# Patient Record
Sex: FEMALE | Race: WHITE | Employment: PART TIME | URBAN - METROPOLITAN AREA
[De-identification: names, ages, dates, MRNs, and addresses within clinical notes are randomized per-mention and may not be internally consistent; named-entity substitution may affect disease eponyms.]

---

## 2017-01-06 PROBLEM — L70.0 ACNE VULGARIS: Status: ACTIVE | Noted: 2017-01-06

## 2017-11-14 ENCOUNTER — OFFICE VISIT (OUTPATIENT)
Dept: NEUROLOGY | Age: 32
End: 2017-11-14
Payer: COMMERCIAL

## 2017-11-14 VITALS
HEIGHT: 58 IN | SYSTOLIC BLOOD PRESSURE: 163 MMHG | WEIGHT: 128 LBS | DIASTOLIC BLOOD PRESSURE: 97 MMHG | HEART RATE: 85 BPM | BODY MASS INDEX: 26.87 KG/M2

## 2017-11-14 DIAGNOSIS — G89.29 CHRONIC INTRACTABLE HEADACHE, UNSPECIFIED HEADACHE TYPE: Primary | ICD-10-CM

## 2017-11-14 DIAGNOSIS — R51.9 CHRONIC INTRACTABLE HEADACHE, UNSPECIFIED HEADACHE TYPE: Primary | ICD-10-CM

## 2017-11-14 PROCEDURE — G8427 DOCREV CUR MEDS BY ELIG CLIN: HCPCS | Performed by: PSYCHIATRY & NEUROLOGY

## 2017-11-14 PROCEDURE — 1036F TOBACCO NON-USER: CPT | Performed by: PSYCHIATRY & NEUROLOGY

## 2017-11-14 PROCEDURE — 99205 OFFICE O/P NEW HI 60 MIN: CPT | Performed by: PSYCHIATRY & NEUROLOGY

## 2017-11-14 PROCEDURE — G8598 ASA/ANTIPLAT THER USED: HCPCS | Performed by: PSYCHIATRY & NEUROLOGY

## 2017-11-14 PROCEDURE — G8484 FLU IMMUNIZE NO ADMIN: HCPCS | Performed by: PSYCHIATRY & NEUROLOGY

## 2017-11-14 PROCEDURE — G8417 CALC BMI ABV UP PARAM F/U: HCPCS | Performed by: PSYCHIATRY & NEUROLOGY

## 2017-11-14 RX ORDER — TOPIRAMATE 100 MG/1
100 TABLET, FILM COATED ORAL NIGHTLY
Qty: 60 TABLET | Refills: 5 | Status: SHIPPED | OUTPATIENT
Start: 2017-11-14 | End: 2017-11-22 | Stop reason: SDUPTHER

## 2017-11-14 RX ORDER — TOPIRAMATE 50 MG/1
50 TABLET, FILM COATED ORAL DAILY
Qty: 30 TABLET | Refills: 5 | Status: SHIPPED | OUTPATIENT
Start: 2017-11-14 | End: 2017-11-22 | Stop reason: SDUPTHER

## 2017-11-14 NOTE — PROGRESS NOTES
tablet Take 1 tablet by mouth every 8 hours as needed for Nausea or Vomiting 2/23/17  Yes Lincoln Stover NP   Docusate Sodium 100 MG TABS Take by mouth 2 times daily   Yes Historical Provider, MD   acetaminophen (ACETAMINOPHEN 8 HOUR) 650 MG extended release tablet Take by mouth 1/7/09  Yes Historical Provider, MD   fluticasone (FLONASE) 50 MCG/ACT nasal spray 1 spray by Nasal route 2 times daily 1/6/17  Yes Nelida Bateman DO   albuterol sulfate  (90 BASE) MCG/ACT inhaler Inhale 2 puffs into the lungs every 4 hours as needed for Wheezing Ventolin HFA 2/29/16  Yes Payal Trevino MD   topiramate (TOPAMAX) 100 MG tablet Take 1 tablet by mouth nightly 8/23/17   Anna Marie Razo MD   OnabotulinumtoxinA (BOTOX IJ) Inject as directed Indications: for migraines    Historical Provider, MD    Scheduled Meds:  Continuous Infusions:  PRN Meds:.  Past Medical History   has a past medical history of Anxiety; Asthma; Depression; GERD (gastroesophageal reflux disease); Hyperlipidemia; Hypertension; Migraines; Moyamoya; Ovarian cyst; PCOS (polycystic ovarian syndrome); Raynaud disease; Stroke [facility]); and [facility] Past Surgical History   has a past surgical history that includes Carotid-subclavian Bypass Graft; cyst removal (Right, 2015); Dental surgery; Dilation and curettage of uterus; Tubal ligation; Cholecystectomy; Intracranial artery angioplasty; and Brain aneurysm surgery (06/2016). Social History   reports that she has quit smoking. Her smoking use included Cigarettes. She has never used smokeless tobacco.   reports that she does not drink alcohol. reports that she does not use drugs. Family History  family history includes Alcohol Abuse in her father; Cancer in her maternal aunt; Diabetes in her father; Heart Attack in her father and maternal grandmother; High Blood Pressure in her father; Other in her father and paternal grandfather; Stroke in her father.     Review of Systems:  CONSTITUTIONAL:  negative for fevers, chills, fatigue and malaise    EYES:  negative for double vision, blurred vision and photophobia     HEENT:  negative for tinnitus, epistaxis and sore throat    RESPIRATORY:  negative for cough, shortness of breath, wheezing    CARDIOVASCULAR:  negative for chest pain, palpitations, syncope, edema    GASTROINTESTINAL:  negative for nausea, vomiting    GENITOURINARY:  negative for incontinence    MUSCULOSKELETAL:  negative for neck or back pain    NEUROLOGICAL:  Negative for weakness and tingling  negative for headaches and dizziness    PSYCHIATRIC:  negative for anxiety      Review of systems otherwise negative. OBJECTIVE:   Vitals: BP (!) 163/97 (Site: Left Arm, Position: Sitting, Cuff Size: Small Adult)   Pulse 85   Ht 4' 10\" (1.473 m)   Wt 128 lb (58.1 kg)   BMI 26.75 kg/m²     Vitals:    11/14/17 1536   BP: (!) 163/97   Pulse: 85     General appearance: Lying in bed, NAD  HEENT: Head: Normocephalic, no lesions, without obvious abnormality.   Neck: no adenopathy, no carotid bruit, no JVD, supple, symmetrical, trachea midline and thyroid not enlarged, symmetric, no tenderness/mass/nodules  Lungs: clear to auscultation bilaterally  Heart: regular rate and rhythm, S1, S2 normal, no murmur, click, rub or gallop  Abdomen: soft, non-tender; nondistended, bowel sounds normal  Extremities: extremities normal, atraumatic, no cyanosis or edema  Groin: groin c/d/i  Neurologic: Mental status: Alert, oriented, thought content appropriate, Alert and oriented x 3,   Language/speech: intact language, attention, knowledge  CN: II-XII intact  MOTOR: 5/5 throughout with normal tone and bulk for age  SENSORY: LT and PP symmetrical and intact  COORDINATION: F to N and Gait intact for age    LABS:   No results for input(s): WBC, HGB, HCT, PLT, NA, K, CL, CO2, BUN, CREATININE, MG, PHOS, CALCIUM, PTT, INR, AST, ALT, BILITOT, BILIDIR, NITRU, COLORU, BACTERIA in the last 72 The findings and the plan discussed with the patient and all her questions were answered. Thank you very much for your referral, please do not hesitate to contact me with any questions.     Aditya Wright MD Pager: [phone]  Stroke, [facility] Stroke [address] [place] [address]  Pager [phone]  Electronically signed 11/14/2017 at 4:10 PM

## 2017-11-22 ENCOUNTER — HOSPITAL ENCOUNTER (OUTPATIENT)
Dept: INTERVENTIONAL RADIOLOGY/VASCULAR | Age: 32
Discharge: HOME | End: 2017-11-22
Payer: COMMERCIAL

## 2017-11-22 VITALS
DIASTOLIC BLOOD PRESSURE: 77 MMHG | TEMPERATURE: 98.3 F | WEIGHT: 128 LBS | RESPIRATION RATE: 20 BRPM | HEART RATE: 79 BPM | SYSTOLIC BLOOD PRESSURE: 132 MMHG | BODY MASS INDEX: 26.87 KG/M2 | OXYGEN SATURATION: 99 % | HEIGHT: 58 IN

## 2017-11-22 DIAGNOSIS — I72.9 ANEURYSM (HCC): ICD-10-CM

## 2017-11-22 LAB
GFR NON-AFRICAN AMERICAN: 56 ML/MIN
GFR SERPL CREATININE-BSD FRML MDRD: >60 ML/MIN
GFR SERPL CREATININE-BSD FRML MDRD: ABNORMAL ML/MIN/{1.73_M2}
GLUCOSE BLD-MCNC: 82 MG/DL (ref 74–100)
POC CHLORIDE: 113 MMOL/L (ref 98–107)
POC CREATININE: 1.12 MG/DL (ref 0.51–1.19)
POC HEMATOCRIT: 43 % (ref 36–46)
POC HEMOGLOBIN: 14.5 G/DL (ref 12–16)
POC POTASSIUM: 5.1 MMOL/L (ref 3.5–4.5)
POC SODIUM: 146 MMOL/L (ref 138–146)

## 2017-11-22 PROCEDURE — 36222 PLACE CATH CAROTID/INOM ART: CPT | Performed by: PSYCHIATRY & NEUROLOGY

## 2017-11-22 PROCEDURE — 6370000000 HC RX 637 (ALT 250 FOR IP): Performed by: PSYCHIATRY & NEUROLOGY

## 2017-11-22 PROCEDURE — 82565 ASSAY OF CREATININE: CPT

## 2017-11-22 PROCEDURE — C1769 GUIDE WIRE: HCPCS

## 2017-11-22 PROCEDURE — 82435 ASSAY OF BLOOD CHLORIDE: CPT

## 2017-11-22 PROCEDURE — 36223 PLACE CATH CAROTID/INOM ART: CPT | Performed by: PSYCHIATRY & NEUROLOGY

## 2017-11-22 PROCEDURE — 36227 PLACE CATH XTRNL CAROTID: CPT | Performed by: PSYCHIATRY & NEUROLOGY

## 2017-11-22 PROCEDURE — 2580000003 HC RX 258: Performed by: PSYCHIATRY & NEUROLOGY

## 2017-11-22 PROCEDURE — 7100000010 HC PHASE II RECOVERY - FIRST 15 MIN

## 2017-11-22 PROCEDURE — 6360000002 HC RX W HCPCS

## 2017-11-22 PROCEDURE — 6360000004 HC RX CONTRAST MEDICATION: Performed by: PSYCHIATRY & NEUROLOGY

## 2017-11-22 PROCEDURE — 82947 ASSAY GLUCOSE BLOOD QUANT: CPT

## 2017-11-22 PROCEDURE — 7100000011 HC PHASE II RECOVERY - ADDTL 15 MIN

## 2017-11-22 PROCEDURE — 85014 HEMATOCRIT: CPT

## 2017-11-22 PROCEDURE — 84132 ASSAY OF SERUM POTASSIUM: CPT

## 2017-11-22 PROCEDURE — 36226 PLACE CATH VERTEBRAL ART: CPT | Performed by: PSYCHIATRY & NEUROLOGY

## 2017-11-22 PROCEDURE — 84295 ASSAY OF SERUM SODIUM: CPT

## 2017-11-22 PROCEDURE — 6370000000 HC RX 637 (ALT 250 FOR IP)

## 2017-11-22 PROCEDURE — 36224 PLACE CATH CAROTD ART: CPT | Performed by: PSYCHIATRY & NEUROLOGY

## 2017-11-22 RX ORDER — IODIXANOL 270 MG/ML
100 INJECTION, SOLUTION INTRAVASCULAR
Status: COMPLETED | OUTPATIENT
Start: 2017-11-22 | End: 2017-11-22

## 2017-11-22 RX ORDER — ACETAMINOPHEN 500 MG
1000 TABLET ORAL ONCE
Status: COMPLETED | OUTPATIENT
Start: 2017-11-22 | End: 2017-11-22

## 2017-11-22 RX ORDER — SODIUM CHLORIDE 9 MG/ML
INJECTION, SOLUTION INTRAVENOUS CONTINUOUS
Status: DISCONTINUED | OUTPATIENT
Start: 2017-11-22 | End: 2017-11-25 | Stop reason: HOSPADM

## 2017-11-22 RX ADMIN — ACETAMINOPHEN 1000 MG: 500 TABLET ORAL at 12:52

## 2017-11-22 RX ADMIN — SODIUM CHLORIDE: 9 INJECTION, SOLUTION INTRAVENOUS at 08:31

## 2017-11-22 RX ADMIN — IODIXANOL 80 ML: 270 INJECTION, SOLUTION INTRAVASCULAR at 11:20

## 2017-11-22 ASSESSMENT — PAIN SCALES - GENERAL: PAINLEVEL_OUTOF10: 7

## 2017-11-22 NOTE — H&P
Endovascular Neurosurgery Consult    Pt Name: Carolina Martni  MRN: [id number]  YOB: 1985  Date of evaluation: 11/22/2017  Primary Care Physician: No primary care provider on file. 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 SUBJECTIVE:   History of Chief Complaint:      Carolina Martin is a 27 y.o. female with PMH of stroke at age 21 & 19 and TIA at the age of 19. On previous stroke work up ,she was found to have right ICA occlusion, s/p by pass surgery(from radial and temporal artery). 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 She was evaluated by Dr Jaqueline Jo on 11/14/17 and the decision was to proceed with conventional diagnostic angiogram to follow up on previously treated right ICA occlusion and basilar tip aneurysm.      Allergies  is allergic to cymbalta [duloxetine hcl]; duloxetine; and percocet [oxycodone-acetaminophen]. Medications  Prior to Admission medications    Medication Sig Start Date End Date Taking?  Authorizing Provider   topiramate (TOPAMAX) 50 MG tablet Take 1 tablet by mouth daily 11/14/17   Balinda Goltz, MD   topiramate (TOPAMAX) 100 MG tablet Take 1 tablet by mouth nightly 50 mg in AM (diffrient script written)  100 mg nightly 11/14/17   Balinda Goltz, MD   magnesium oxide (MAG-OX) 400 (241.3 Mg) MG TABS tablet TAKE ONE TABLET BY MOUTH ONCE DAILY 10/24/17   Irvin Bateman DO   famotidine (PEPCID) 20 MG tablet Take 1 tablet by mouth 2 times daily 10/3/17 1/1/18  Irvin Bateman DO   b complex vitamins capsule Take 1 capsule by mouth daily    Historical Provider, MD   topiramate (TOPAMAX) 100 MG tablet Take 1 tablet by mouth nightly 8/23/17   Papito Joshi MD   topiramate (TOPAMAX) 50 MG tablet Take 1 tablet by mouth daily 8/23/17   Papito Joshi MD   losartan (COZAAR) 25 MG tablet TAKE ONE TABLET BY MOUTH ONCE DAILY 8/7/17   Irvin Bateman DO   FLUoxetine (PROZAC) 40 MG capsule Take 40 21. On previous stroke work up ,she was found to have right ICA occlusion, s/p by pass surgery(from radial and temporal artery). 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  She was evaluated by Dr Betty Bateman on 11/14/17 and the decision was to proceed with conventional diagnostic angiogram to follow up on previously treated right ICA occlusion and basilar tip aneurysm.        PLANS:   Conventional diagnostic angiogram.     Elaine Jurado MD  Pager [phone]  Stroke, [facility]  [id number] [place]  Electronically signed 11/22/2017 at 8:10 AM

## 2017-11-22 NOTE — PROGRESS NOTES
Remainder of the air removed from safeguard. No bleeding or hematoma noted. Band aid applied. Pedal pulses palpable. Ambulated to bathroom and in halls. Gait steady. . Right groin puncture site and right pedal pulse assessment unchanged.

## 2017-11-22 NOTE — PROGRESS NOTES
All discharge instructions reviewed, questions answered, paper signed and given copy. Patient discharged per wheelchair with boyfriend and belongings.

## 2017-11-22 NOTE — PROCEDURES
[facility]    NEUROENDOVASCULAR SERVICE: POST-OP NOTE: 11/22/2017    Pt Name: Magdalena Mckeon  MRN: [id number]  YOB: 1985  Date of Procedure: 11/22/2017  Primary Care Physician: No primary care provider on file. Pre-Procedural Diagnosis:bilateral ica stenosis, basilar artery aneurysm  Post-Procedural Diagnosis:same    Procedure Performed:conventional cerebral angiogram    Surgeon:   Niraj James MD    1st Assistant:  Jennifer Whyte    Fellow:  Rocco Purdy    PRE-PROCEDURAL EXAM:  MODIFIED ARIEL SCORE: 2 - Slight disability:  unable to carry out all previous activities, but able to look after own affairs without assistance. Neurological exam performed and unchanged from initial H&P or consult    Anesthesia: IV Moderate Sedation  Complications: none    Intra-Operative EXAM:  Patient sedated with unchanged limited neurological exam    EBL: < Minimal      Cc            Specimens: Were not Obtained  Contrast:     Visipaque 270 low osmolar 80 Cc             Fluoro: 8.8 min    Findings:  Please see dictated Radiology note for further details  1. Bilateral ICA occlusion. 2. Right ECA- ICA bypass (superficial temporal artery to middle cerebral artery bypass). 3. Extensive collateral vessels and anastomoses between right maxillary artery and right REED, left middle meningeal artery and distal left middle cerebral artery, left vertebral artery and left PCA and MCA territories. 4. Stenosis at right PCA origin. 5. The previously treated left PCA/basilar artery junctional aneurysm is well opacified with no residual filling. POST-PROCEDURAL EXAM :   Stable neurological Exam  Neurological exam performed and unchanged from initial H&P or consult    Closure:  right [place] 5   F        POST-PROCEDURAL MONITORING : see orders  Disposition: Recovery room    Recommendations:  1. Do not bend right leg for 3 hours. 2. Groin checks per protocol.   3. Neuro checks per protocol. 4. Peripheral pulse checks per protocol.     Subha Purdy  Stroke, [facility]  [address]  Electronically signed 11/22/2017 at 4:15 PM

## 2017-12-05 ENCOUNTER — OFFICE VISIT (OUTPATIENT)
Dept: NEUROLOGY | Age: 32
End: 2017-12-05
Payer: COMMERCIAL

## 2017-12-05 VITALS — DIASTOLIC BLOOD PRESSURE: 82 MMHG | SYSTOLIC BLOOD PRESSURE: 124 MMHG | HEART RATE: 116 BPM | HEIGHT: 59 IN

## 2017-12-05 DIAGNOSIS — I65.22 INTERNAL CAROTID ARTERY STENOSIS, LEFT: ICD-10-CM

## 2017-12-05 DIAGNOSIS — I67.5 MOYAMOYA DISEASE: ICD-10-CM

## 2017-12-05 DIAGNOSIS — I72.5 BASILAR ARTERY ANEURYSM (HCC): ICD-10-CM

## 2017-12-05 DIAGNOSIS — I65.21 INTERNAL CAROTID ARTERY OCCLUSION, RIGHT: ICD-10-CM

## 2017-12-05 DIAGNOSIS — G43.709 CHRONIC MIGRAINE W/O AURA W/O STATUS MIGRAINOSUS, NOT INTRACTABLE: Primary | ICD-10-CM

## 2017-12-05 PROCEDURE — 99213 OFFICE O/P EST LOW 20 MIN: CPT | Performed by: NEUROLOGICAL SURGERY

## 2017-12-05 RX ORDER — TOPIRAMATE 100 MG/1
100 TABLET, FILM COATED ORAL NIGHTLY
Qty: 60 TABLET | Refills: 3 | Status: SHIPPED | OUTPATIENT
Start: 2017-12-05 | End: 2018-01-08 | Stop reason: SDUPTHER

## 2017-12-05 NOTE — PROGRESS NOTES
negative for double vision     HEENT:  negative for tinnitus   RESPIRATORY:  negative for cough, shortness of breath    CARDIOVASCULAR:  negative for chest pain, palpitations   GASTROINTESTINAL:  negative for nausea   GENITOURINARY:  negative for hematuria   MUSCULOSKELETAL:  negative for neck pain    NEUROLOGICAL:  See HPI+ occasional gait instability since her aneurysm rupture in 2016   PSYCHIATRIC:  negative for anxiety      Review of systems otherwise negative. OBJECTIVE:   Vitals: /82 (Site: Right Arm, Position: Sitting, Cuff Size: Large Adult)   Pulse 116   Ht 4' 11\" (1.499 m)     On exam today:   Patient is AAO x3, following commands. CN II-XII grossly intact, pupils 2 mm reactive to light bilaterally. Speech:mild dysarthria  Normal tone in four extremities. Normal sensory exam to LT and pain. Muscle strength is 5/5 in 4 extremities. DTRs : +1 in bilateral biceps and knees. Cerebellar exam: No nystagmus. Lungs sounds clear to auscultation bilaterally. Heart exam: normal S1,S2 sounds,RRR,no murmers. Abdomen was soft, NT,ND with positive bowel sounds. Normal bilateral radial and pedal pulses. Groin: puncture site looked clean, dry, no signs of infection or active oozing nor hematoma.      LABS:     CBC:   Lab Results   Component Value Date    WBC 8.7 09/13/2017    WBC 5.3 03/22/2016    RBC 4.19 09/13/2017    HGB 14.0 09/13/2017    HCT 40.9 09/13/2017    MCV 97.5 09/13/2017    MCH 33.4 09/13/2017    MCHC 34.3 09/13/2017    RDW 13.4 09/13/2017     09/13/2017    MPV 8.0 09/13/2017     BMP:    Lab Results   Component Value Date     09/13/2017    K 4.7 09/13/2017     09/13/2017    CO2 18 09/13/2017    BUN 14 09/13/2017    LABALBU 4.1 09/13/2017    CREATININE 1.12 11/22/2017    CREATININE 1.07 09/13/2017    CALCIUM 9.5 09/13/2017    GFRAA >60 03/22/2016    LABGLOM 56 11/22/2017    GLUCOSE 83 09/13/2017     RADIOLOGY:   Images were personally reviewed including:  Conventional

## 2018-01-08 ENCOUNTER — OFFICE VISIT (OUTPATIENT)
Dept: NEUROLOGY | Age: 33
End: 2018-01-08
Payer: COMMERCIAL

## 2018-01-08 VITALS — DIASTOLIC BLOOD PRESSURE: 87 MMHG | HEIGHT: 58 IN | HEART RATE: 89 BPM | SYSTOLIC BLOOD PRESSURE: 125 MMHG

## 2018-01-08 DIAGNOSIS — G43.709 CHRONIC MIGRAINE W/O AURA W/O STATUS MIGRAINOSUS, NOT INTRACTABLE: Primary | ICD-10-CM

## 2018-01-08 DIAGNOSIS — I65.21 OBSTRUCTION OF RIGHT CAROTID ARTERY: ICD-10-CM

## 2018-01-08 PROCEDURE — 1036F TOBACCO NON-USER: CPT | Performed by: PSYCHIATRY & NEUROLOGY

## 2018-01-08 PROCEDURE — G8417 CALC BMI ABV UP PARAM F/U: HCPCS | Performed by: PSYCHIATRY & NEUROLOGY

## 2018-01-08 PROCEDURE — 99215 OFFICE O/P EST HI 40 MIN: CPT | Performed by: PSYCHIATRY & NEUROLOGY

## 2018-01-08 PROCEDURE — G8484 FLU IMMUNIZE NO ADMIN: HCPCS | Performed by: PSYCHIATRY & NEUROLOGY

## 2018-01-08 PROCEDURE — G8599 NO ASA/ANTIPLAT THER USE RNG: HCPCS | Performed by: PSYCHIATRY & NEUROLOGY

## 2018-01-08 PROCEDURE — G8428 CUR MEDS NOT DOCUMENT: HCPCS | Performed by: PSYCHIATRY & NEUROLOGY

## 2018-01-08 RX ORDER — TOPIRAMATE 100 MG/1
100 TABLET, FILM COATED ORAL 2 TIMES DAILY
Qty: 60 TABLET | Refills: 5 | Status: SHIPPED | OUTPATIENT
Start: 2018-01-08 | End: 2018-02-14 | Stop reason: ALTCHOICE

## 2018-01-08 NOTE — PROGRESS NOTES
Clinic Follow Up    Pt Name: Josefa Watkins  MRN: [id number]  YOB: 1985  Date of evaluation: 1/8/2018  Primary Care Physician: Nilam Dominguez  Reason for evaluation: Post angiogram follow up      SUBJECTIVE:     History of Chief Complaint:    Donovan Infante a 27 y.o. female with PMH of stroke at age 21 & 19 and TIA at the age of 19. On previous stroke work up ,she was found to have right ICA occlusion, s/p by pass surgery (from radial and temporal artery). 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 Conventional angiogram performed on 11/22/17 showed complete occlusion of previously clipped proximal right ICA, 99% left stenosis and right ECA-ICA bypass. Since her angiogram patient denies developing any new neurological symptoms however she is still complaining of migraine headache for which she is on topamax. The main concern on today's clinic visit is     Headache and Nausea    Allergies  is allergic to cymbalta [duloxetine hcl]; duloxetine; and percocet [oxycodone-acetaminophen]. Medications  Prior to Admission medications    Medication Sig Start Date End Date Taking?  Authorizing Provider   topiramate (TOPAMAX) 100 MG tablet Take 1 tablet by mouth nightly 12/5/17  Yes Subha Purdy MD   magnesium oxide (MAG-OX) 400 (241.3 Mg) MG TABS tablet TAKE ONE TABLET BY MOUTH ONCE DAILY 10/24/17  Yes Milka Llamas,    b complex vitamins capsule Take 1 capsule by mouth daily   Yes Historical Provider, MD   topiramate (TOPAMAX) 100 MG tablet Take 1 tablet by mouth nightly 8/23/17  Yes Amalia Webb MD   topiramate (TOPAMAX) 50 MG tablet Take 1 tablet by mouth daily 8/23/17  Yes Amalia Webb MD   losartan (COZAAR) 25 MG tablet TAKE ONE TABLET BY MOUTH ONCE DAILY 8/7/17  Yes Nelida Bateman DO   FLUoxetine (PROZAC) 40 MG capsule Take 40 mg by mouth daily  7/3/17  Yes Historical Provider, MD   cetirizine (ZYRTEC) 10 MG tablet  7/4/17  Yes to contact me with any questions.       Vandana Cano MD  Pager [phone]  Stroke, [facility]  [address] [place]  Electronically signed 1/8/2018 at 2:14 PM

## 2018-02-13 ENCOUNTER — HOSPITAL ENCOUNTER (OUTPATIENT)
Dept: MRI IMAGING | Age: 33
Discharge: HOME | End: 2018-02-15
Payer: COMMERCIAL

## 2018-02-13 DIAGNOSIS — Z86.79 S/P ANEURYSM REPAIR: ICD-10-CM

## 2018-02-13 DIAGNOSIS — Z98.890 S/P ANEURYSM REPAIR: ICD-10-CM

## 2018-02-13 LAB
GFR NON-AFRICAN AMERICAN: 57 ML/MIN
GFR SERPL CREATININE-BSD FRML MDRD: >60 ML/MIN
GFR SERPL CREATININE-BSD FRML MDRD: ABNORMAL ML/MIN/{1.73_M2}
POC CREATININE: 1.09 MG/DL (ref 0.51–1.19)

## 2018-02-13 PROCEDURE — A9579 GAD-BASE MR CONTRAST NOS,1ML: HCPCS | Performed by: NEUROLOGICAL SURGERY

## 2018-02-13 PROCEDURE — 82565 ASSAY OF CREATININE: CPT

## 2018-02-13 PROCEDURE — 70546 MR ANGIOGRAPH HEAD W/O&W/DYE: CPT

## 2018-02-13 PROCEDURE — 6360000004 HC RX CONTRAST MEDICATION: Performed by: NEUROLOGICAL SURGERY

## 2018-02-13 RX ADMIN — GADOPENTETATE DIMEGLUMINE 10 ML: 469.01 INJECTION INTRAVENOUS at 17:55

## 2018-02-14 ENCOUNTER — OFFICE VISIT (OUTPATIENT)
Dept: NEUROLOGY | Age: 33
End: 2018-02-14
Payer: COMMERCIAL

## 2018-02-14 ENCOUNTER — TELEPHONE (OUTPATIENT)
Dept: NEUROLOGY | Age: 33
End: 2018-02-14

## 2018-02-14 VITALS
SYSTOLIC BLOOD PRESSURE: 124 MMHG | BODY MASS INDEX: 27.58 KG/M2 | DIASTOLIC BLOOD PRESSURE: 69 MMHG | HEART RATE: 78 BPM | WEIGHT: 131.4 LBS | HEIGHT: 58 IN

## 2018-02-14 DIAGNOSIS — I67.5 MOYAMOYA: Chronic | ICD-10-CM

## 2018-02-14 DIAGNOSIS — I60.9 SUBARACHNOID HEMORRHAGE (HCC): ICD-10-CM

## 2018-02-14 DIAGNOSIS — I72.9 ANEURYSM (HCC): ICD-10-CM

## 2018-02-14 PROCEDURE — G8484 FLU IMMUNIZE NO ADMIN: HCPCS | Performed by: PSYCHIATRY & NEUROLOGY

## 2018-02-14 PROCEDURE — G8598 ASA/ANTIPLAT THER USED: HCPCS | Performed by: PSYCHIATRY & NEUROLOGY

## 2018-02-14 PROCEDURE — 1036F TOBACCO NON-USER: CPT | Performed by: PSYCHIATRY & NEUROLOGY

## 2018-02-14 PROCEDURE — 99204 OFFICE O/P NEW MOD 45 MIN: CPT | Performed by: PSYCHIATRY & NEUROLOGY

## 2018-02-14 PROCEDURE — G8417 CALC BMI ABV UP PARAM F/U: HCPCS | Performed by: PSYCHIATRY & NEUROLOGY

## 2018-02-14 PROCEDURE — G8427 DOCREV CUR MEDS BY ELIG CLIN: HCPCS | Performed by: PSYCHIATRY & NEUROLOGY

## 2018-02-14 RX ORDER — PROMETHAZINE HYDROCHLORIDE 25 MG/1
25 TABLET ORAL EVERY 6 HOURS PRN
Qty: 30 TABLET | Refills: 1 | Status: SHIPPED | OUTPATIENT
Start: 2018-02-14 | End: 2018-02-21

## 2018-02-14 RX ORDER — CYCLOBENZAPRINE HCL 5 MG
5 TABLET ORAL 3 TIMES DAILY PRN
COMMUNITY

## 2018-02-14 RX ORDER — ASPIRIN 325 MG
325 TABLET ORAL DAILY
COMMUNITY

## 2018-02-14 RX ORDER — CLINDAMYCIN HYDROCHLORIDE 300 MG/1
300 CAPSULE ORAL 3 TIMES DAILY
COMMUNITY
End: 2019-07-09 | Stop reason: ALTCHOICE

## 2018-02-14 RX ORDER — AMITRIPTYLINE HYDROCHLORIDE 25 MG/1
TABLET, FILM COATED ORAL
Qty: 120 TABLET | Refills: 1 | Status: SHIPPED | OUTPATIENT
Start: 2018-02-14

## 2018-02-14 RX ORDER — TOPIRAMATE 100 MG/1
100 TABLET, FILM COATED ORAL 2 TIMES DAILY
COMMUNITY

## 2018-02-14 NOTE — PROGRESS NOTES
side effects and Botox, and would like to go back on it, if possible. Prior Neuroimaging:  Carotid angiogram 11/22/17:  1. Complete occlusion of the previously clipped proximal right ICA       2. Near complete occlusion of the left ICA/string sign, 99% stenosis. 3.  Right ECA- ICA patent bypass (superficial temporal artery to middle cerebral artery bypass). 4.  Bilateral REED territories supplied by collaterals from the right ECA. 5.  Left MCA and REED territories are supplied by collaterals from the left ECA and vertebral artery PCA posterior and temporal lobe branches and Pcomms. 6.  The previously coiled basilar tip aneurysm is well opacified with no residual filling     Noncontrast head CT 6/9/16:  Extensive subarachnoid hemorrhage. Previous craniotomy. Old looking ischemic infarct in the right frontal lobe.      Current abortive meds: tylenol, marijuana (occasional relief), zofran (makes headaches worse)  Current prophylactic meds: topamax 100 mg BID (very mild improvement, no side effects)  Previous abortive medications tried: none  Previous prophylactic medications tried: none    Caffeine intake: none  Smoking: quit smoking 15 years ago          Past Medical History:   Diagnosis Date    Anxiety     Asthma     Depression     GERD (gastroesophageal reflux disease)     Hyperlipidemia     Hypertension     Migraines     Moyamoya     Ovarian cyst     PCOS (polycystic ovarian syndrome)     Raynaud disease     [facility]     x4    [facility] ([facility]        Past Surgical History:   Procedure Laterality Date    BRAIN ANEURYSM SURGERY  06/2016    [facility]      right side     CHOLECYSTECTOMY      CYST REMOVAL Right 2015    ovarian right side     DENTAL SURGERY      all top teeth    DILATION AND CURETTAGE OF UTERUS      novasure ablation    INTRACRANIAL ARTERY ANGIOPLASTY      temporal artery removed    OTHER clonus: absent  Left ankle clonus: absent        Assessment:  Moyamoya syndrome, status post right ICAREED bypass  History of right frontal stroke  History of subarachnoid hemorrhage secondary to basilar tip aneurysm, status post coiling  Chronic daily headache    Plan: This is a 27 y.o. female who presents for a 3 year history of headaches, that got worse after she sustained an aneurysmal subarachnoid hemorrhage. She was previously following with Dr. Bailee Deluca at [facility], and was getting 50% relief with Botox injections. However, the improvement only lasted approximately 2-3 weeks. She is currently on Topamax 100 mg twice a day with very mild improvement, has not tried any other oral prophylactic medications recently. Discussed treatment options with patient. Will start prior authorization to restart her Botox injections. Given that Botox only worked transiently for her, will also add amitriptyline 25 mg daily at bedtime to her current regimen, may uptitrate to a maximum dose of 100 mg daily at bedtime over the next 4 weeks, if needed. Side effects discussed in detail with patient. Will also switch her Zofran to Phenergan since patient reports the Zofran doesn't help her nausea much and actually makes her headaches worse.  Follow-up in 6 weeks          Signed: Ximena Villanueva MD

## 2018-02-26 ENCOUNTER — TELEPHONE (OUTPATIENT)
Dept: NEUROLOGY | Age: 33
End: 2018-02-26

## 2018-02-26 NOTE — TELEPHONE ENCOUNTER
Prior auth form for Botox was faxed to [address] [place] [address] [facility]

## 2018-03-22 ENCOUNTER — PROCEDURE VISIT (OUTPATIENT)
Dept: NEUROLOGY | Age: 33
End: 2018-03-22
Payer: COMMERCIAL

## 2018-03-22 PROCEDURE — 64615 CHEMODENERV MUSC MIGRAINE: CPT | Performed by: PSYCHIATRY & NEUROLOGY

## 2018-03-22 RX ORDER — BUTALBITAL, ACETAMINOPHEN AND CAFFEINE 50; 325; 40 MG/1; MG/1; MG/1
1 TABLET ORAL EVERY 6 HOURS PRN
Qty: 15 TABLET | Refills: 0 | Status: SHIPPED | OUTPATIENT
Start: 2018-03-22 | End: 2022-09-09

## 2018-03-22 NOTE — PROGRESS NOTES
[facility]  [address] [facility]  Ph: [phone] or [phone]  FAX: [phone]    Kailey Lopez M.D.  Edi Mcnally M.D. Mary Brown M.D. Kassandra Reyes M.D. Bib Ahn MD      Indication for treatment: Chronic migraines  Consent form was signed. Please see the scanned form. Potential risks and benefits for the procedure were explained to the patient. Procedure: 30-gauge half inch needle was used and 200 U vial Botox was prepared with 4ml 0.9% NS.155 units of Botox were used and 45 units of Botox were discarded. Botox was injected at 31 different sites as follows:   Order  Muscle  Units injected    A  Corrugator1  10 units at 2 div sites    B  Procerus  5 Units in 1 site    C  Frontalis¹  20 Units div. 4 sites    D  Temporalis¹  40 Units div 8 site    E  Occipitalis¹  30 Units div. 6 sites    F  Cervical paraspinal muscles¹  20 Units div 4 sites    G  Trapezius¹  30 Units div 6 sites    Total Dose  155 Units div 31 sites    2 Dose distributed bilaterally    Blood loss: Less than 1 cc    Complications: none    Disposition: Post-botox instructions were reviewed and provided to the patient. Patient was advised to seek medical care for any generalized muscle weakness, double vision, ptosis, trouble swallowing, difficulty talking or difficulty breathing. The patient will return in 3 months for subsequent botox treatments.

## 2018-05-02 ENCOUNTER — OFFICE VISIT (OUTPATIENT)
Dept: NEUROLOGY | Age: 33
End: 2018-05-02
Payer: COMMERCIAL

## 2018-05-02 VITALS
DIASTOLIC BLOOD PRESSURE: 82 MMHG | WEIGHT: 132 LBS | BODY MASS INDEX: 27.71 KG/M2 | HEIGHT: 58 IN | SYSTOLIC BLOOD PRESSURE: 120 MMHG | HEART RATE: 90 BPM

## 2018-05-02 DIAGNOSIS — I67.5 MOYAMOYA: Primary | Chronic | ICD-10-CM

## 2018-05-02 DIAGNOSIS — I60.9 SUBARACHNOID HEMORRHAGE (HCC): ICD-10-CM

## 2018-05-02 DIAGNOSIS — G43.709 CHRONIC MIGRAINE W/O AURA W/O STATUS MIGRAINOSUS, NOT INTRACTABLE: ICD-10-CM

## 2018-05-02 PROCEDURE — 99213 OFFICE O/P EST LOW 20 MIN: CPT | Performed by: PSYCHIATRY & NEUROLOGY

## 2018-05-02 PROCEDURE — 1036F TOBACCO NON-USER: CPT | Performed by: PSYCHIATRY & NEUROLOGY

## 2018-05-02 PROCEDURE — G8417 CALC BMI ABV UP PARAM F/U: HCPCS | Performed by: PSYCHIATRY & NEUROLOGY

## 2018-05-02 PROCEDURE — G8598 ASA/ANTIPLAT THER USED: HCPCS | Performed by: PSYCHIATRY & NEUROLOGY

## 2018-05-02 PROCEDURE — G8428 CUR MEDS NOT DOCUMENT: HCPCS | Performed by: PSYCHIATRY & NEUROLOGY

## 2018-05-10 ENCOUNTER — TELEPHONE (OUTPATIENT)
Dept: NEUROLOGY | Age: 33
End: 2018-05-10

## 2018-05-25 ENCOUNTER — TELEPHONE (OUTPATIENT)
Dept: NEUROLOGY | Age: 33
End: 2018-05-25

## 2018-05-29 ENCOUNTER — TELEPHONE (OUTPATIENT)
Dept: NEUROLOGY | Age: 33
End: 2018-05-29

## 2018-07-09 ENCOUNTER — OFFICE VISIT (OUTPATIENT)
Dept: NEUROLOGY | Age: 33
End: 2018-07-09
Payer: COMMERCIAL

## 2018-07-09 VITALS
HEART RATE: 87 BPM | SYSTOLIC BLOOD PRESSURE: 109 MMHG | BODY MASS INDEX: 27.92 KG/M2 | HEIGHT: 58 IN | WEIGHT: 133 LBS | DIASTOLIC BLOOD PRESSURE: 76 MMHG

## 2018-07-09 DIAGNOSIS — G43.709 CHRONIC MIGRAINE W/O AURA W/O STATUS MIGRAINOSUS, NOT INTRACTABLE: ICD-10-CM

## 2018-07-09 PROCEDURE — G8598 ASA/ANTIPLAT THER USED: HCPCS | Performed by: PSYCHIATRY & NEUROLOGY

## 2018-07-09 PROCEDURE — G8428 CUR MEDS NOT DOCUMENT: HCPCS | Performed by: PSYCHIATRY & NEUROLOGY

## 2018-07-09 PROCEDURE — G8417 CALC BMI ABV UP PARAM F/U: HCPCS | Performed by: PSYCHIATRY & NEUROLOGY

## 2018-07-09 PROCEDURE — 1036F TOBACCO NON-USER: CPT | Performed by: PSYCHIATRY & NEUROLOGY

## 2018-07-09 PROCEDURE — 99215 OFFICE O/P EST HI 40 MIN: CPT | Performed by: PSYCHIATRY & NEUROLOGY

## 2018-07-09 NOTE — PROGRESS NOTES
angioplasty; Brain aneurysm surgery (06/2016); and other surgical history (11/22/2017). Social History   reports that she quit smoking about 16 years ago. Her smoking use included Cigarettes. She has never used smokeless tobacco.   reports that she does not drink alcohol. reports that she uses drugs, including Marijuana. Family History  family history includes Alcohol Abuse in her father; Cancer in her maternal aunt; Diabetes in her father; Heart Attack in her father and maternal grandmother; High Blood Pressure in her father; Other in her father and paternal grandfather; Stroke in her father. Review of Systems:  CONSTITUTIONAL:  Patient had a recent flu. EYES:  negative for double vision     HEENT:  negative for tinnitus   RESPIRATORY:  negative for cough, shortness of breath    CARDIOVASCULAR:  negative for chest pain, palpitations   GASTROINTESTINAL:  negative for nausea   GENITOURINARY:  negative for hematuria   MUSCULOSKELETAL:  negative for neck pain    NEUROLOGICAL:  See HPI+ occasional gait instability since her aneurysm rupture in 2016   PSYCHIATRIC:  negative for anxiety      Review of systems otherwise negative. OBJECTIVE:   Vitals: /76 (Site: Right Arm, Position: Sitting, Cuff Size: Small Adult)   Pulse 87   Ht 4' 10\" (1.473 m)   Wt 133 lb (60.3 kg)   BMI 27.80 kg/m²     On exam today:   Patient is AAO x3, following commands. CN II-XII grossly intact, pupils 2 mm reactive to light bilaterally. Speech:mild dysarthria  Normal tone in four extremities. Normal sensory exam to LT and pain. Muscle strength is 5/5 in 4 extremities. DTRs : +1 in bilateral biceps and knees. Cerebellar exam: No nystagmus. Lungs sounds clear to auscultation bilaterally. Heart exam: normal S1,S2 sounds,RRR,no murmers. Abdomen was soft, NT,ND with positive bowel sounds. Normal bilateral radial and pedal pulses. Groin: puncture site looked clean, dry, no signs of infection or active oozing nor hematoma. LABS:   CBC:   Lab Results   Component Value Date    WBC 8.7 09/13/2017    WBC 5.3 03/22/2016    RBC 4.19 09/13/2017    HGB 14.0 09/13/2017    HCT 40.9 09/13/2017    MCV 97.5 09/13/2017    MCH 33.4 09/13/2017    MCHC 34.3 09/13/2017    RDW 13.4 09/13/2017     09/13/2017    MPV 8.0 09/13/2017     BMP:    Lab Results   Component Value Date     09/13/2017    K 4.7 09/13/2017     09/13/2017    CO2 18 09/13/2017    BUN 14 09/13/2017    LABALBU 4.1 09/13/2017    CREATININE 1.09 02/13/2018    CREATININE 1.07 09/13/2017    CALCIUM 9.5 09/13/2017    GFRAA >60 03/22/2016    LABGLOM 57 02/13/2018    GLUCOSE 83 09/13/2017     RADIOLOGY:   Images were personally reviewed including:  Conventional angiogram in 11/22/2017    IMPRESSIONS:   Loa Dakin D Thomas is a 27 y.o. female with PMH of stroke at age 21 & 19 and TIA at the age of 19. On previous stroke work up ,she was found to have right ICA occlusion, s/p by pass surgery (from radial and temporal artery). 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 Conventional angiogram performed on 11/22/17 showed complete occlusion of previously clipped proximal right ICA, 99% left stenosis and right ECA-ICA bypass. Since her angiogram patient denies developing any new neurological symptoms however she is still complaining of migraine headache for which she is on topamax. The main concern on today's clinic visit is     Headache and Nausea    Two years with no new strokes or hemorrhage      PLANS:     Moyamoya  Left ICA stenosis/string sing/occlusion  Right ICA occlusion s/p right ECA-ICA bypass  Basilar tip aneurysm s/p coil embolization    Continue ASA  Will discuss the patient last angiogram in our multi displenary team meeting. Patient to follow up with Dr Roge Apodaca in January for discussing of any possible intervention.      Migraine  Continue topamax(topamax refilled)  Increase the Topamax to 100 mg BID slowly  Referral to Dr Louise Knapp for botox injection    Consultation Visit Time:  40 minutes  Patient given educational materials - see patient instructions. Discussed use, benefit, and side effects of prescribed medications. Personally reviewed imaging with patients and all questions answered. Pt voiced understanding. Patient agreed with treatment plan. Follow up as directed below. Greater than 50% of the time was for counseling and providing answer to the patient question. The findings and the plan discussed with the patient and all her questions were answered. Thank you very much for your referral, please do not hesitate to contact me with any questions.       Ammy Washington MD  Pager [phone]  Stroke, [facility]  [address] [place]  Electronically signed 7/9/2018 at 2:28 PM

## 2019-07-09 ENCOUNTER — OFFICE VISIT (OUTPATIENT)
Dept: NEUROLOGY | Age: 34
End: 2019-07-09
Payer: MEDICARE

## 2019-07-09 VITALS — SYSTOLIC BLOOD PRESSURE: 131 MMHG | HEART RATE: 87 BPM | DIASTOLIC BLOOD PRESSURE: 78 MMHG

## 2019-07-09 DIAGNOSIS — I67.5 MOYAMOYA: Primary | Chronic | ICD-10-CM

## 2019-07-09 PROCEDURE — 99215 OFFICE O/P EST HI 40 MIN: CPT | Performed by: PSYCHIATRY & NEUROLOGY

## 2019-07-09 PROCEDURE — G8417 CALC BMI ABV UP PARAM F/U: HCPCS | Performed by: PSYCHIATRY & NEUROLOGY

## 2019-07-09 PROCEDURE — G8598 ASA/ANTIPLAT THER USED: HCPCS | Performed by: PSYCHIATRY & NEUROLOGY

## 2019-07-09 PROCEDURE — 1036F TOBACCO NON-USER: CPT | Performed by: PSYCHIATRY & NEUROLOGY

## 2019-07-09 PROCEDURE — G8428 CUR MEDS NOT DOCUMENT: HCPCS | Performed by: PSYCHIATRY & NEUROLOGY

## 2019-07-09 RX ORDER — BENAZEPRIL HYDROCHLORIDE 10 MG/1
10 TABLET ORAL DAILY
COMMUNITY

## 2019-07-09 RX ORDER — TIZANIDINE 4 MG/1
4 TABLET ORAL EVERY 6 HOURS PRN
COMMUNITY

## 2019-07-09 NOTE — PROGRESS NOTES
artery angioplasty; Brain aneurysm surgery (06/2016); and other surgical history (11/22/2017). Social History   reports that she quit smoking about 17 years ago. Her smoking use included cigarettes. She has never used smokeless tobacco.   reports that she does not drink alcohol. reports that she has current or past drug history. Drug: Marijuana. Family History  family history includes Alcohol Abuse in her father; Cancer in her maternal aunt; Diabetes in her father; Heart Attack in her father and maternal grandmother; High Blood Pressure in her father; Other in her father and paternal grandfather; Stroke in her father. Review of Systems:  CONSTITUTIONAL:  Patient had a recent flu. EYES:  negative for double vision     HEENT:  negative for tinnitus   RESPIRATORY:  negative for cough, shortness of breath    CARDIOVASCULAR:  negative for chest pain, palpitations   GASTROINTESTINAL:  negative for nausea   GENITOURINARY:  negative for hematuria   MUSCULOSKELETAL:  negative for neck pain    NEUROLOGICAL:  See HPI+ occasional gait instability since her aneurysm rupture in 2016   PSYCHIATRIC:  negative for anxiety      Review of systems otherwise negative. OBJECTIVE:   Vitals: /78 (Site: Right Upper Arm, Position: Sitting, Cuff Size: Medium Adult)   Pulse 87     On exam today:   Patient is AAO x3, following commands. CN II-XII grossly intact, pupils 2 mm reactive to light bilaterally. Speech:mild dysarthria  Normal tone in four extremities. Normal sensory exam to LT and pain. Muscle strength is 5/5 in 4 extremities. DTRs : +1 in bilateral biceps and knees. Cerebellar exam: No nystagmus. Lungs sounds clear to auscultation bilaterally. Heart exam: normal S1,S2 sounds,RRR,no murmers. Abdomen was soft, NT,ND with positive bowel sounds. Normal bilateral radial and pedal pulses. Groin: puncture site looked clean, dry, no signs of infection or active oozing nor hematoma.      LABS:   CBC:   Lab Results center    Consultation Visit: Patient given educational materials - see patient instructions. Discussed use, benefit, and side effects of prescribed medications. Personally reviewed imaging with patients and all questions answered. Pt voiced understanding. Patient agreed with treatment plan. Follow up as directed below. Greater than 50% of the time was for counseling and providing answer to the patient question. The findings and the plan discussed with the patient and all her questions were answered. Thank you very much for your referral, please do not hesitate to contact me with any questions.       Patricia Chicas MD  Pager [phone]  Stroke, [facility]  [address] [place]  Electronically signed 7/9/2019 at 2:51 PM

## 2019-07-16 ENCOUNTER — HOSPITAL ENCOUNTER (OUTPATIENT)
Dept: MRI IMAGING | Age: 34
Discharge: HOME | End: 2019-07-18
Payer: MEDICARE

## 2019-07-16 DIAGNOSIS — I67.5 MOYAMOYA: Chronic | ICD-10-CM

## 2019-07-16 LAB
GFR NON-AFRICAN AMERICAN: 54 ML/MIN
GFR SERPL CREATININE-BSD FRML MDRD: >60 ML/MIN
GFR SERPL CREATININE-BSD FRML MDRD: ABNORMAL ML/MIN/{1.73_M2}
POC CREATININE: 1.15 MG/DL (ref 0.51–1.19)

## 2019-07-16 PROCEDURE — 6360000004 HC RX CONTRAST MEDICATION: Performed by: PSYCHIATRY & NEUROLOGY

## 2019-07-16 PROCEDURE — A9576 INJ PROHANCE MULTIPACK: HCPCS | Performed by: PSYCHIATRY & NEUROLOGY

## 2019-07-16 PROCEDURE — 82565 ASSAY OF CREATININE: CPT

## 2019-07-16 PROCEDURE — 70546 MR ANGIOGRAPH HEAD W/O&W/DYE: CPT

## 2019-07-16 RX ORDER — SODIUM CHLORIDE 0.9 % (FLUSH) 0.9 %
10 SYRINGE (ML) INJECTION ONCE
Status: DISCONTINUED | OUTPATIENT
Start: 2019-07-16 | End: 2019-07-19 | Stop reason: HOSPADM

## 2019-07-16 RX ADMIN — GADOTERIDOL 12 ML: 279.3 INJECTION, SOLUTION INTRAVENOUS at 17:14

## 2020-07-31 RX ORDER — ERENUMAB-AOOE 70 MG/ML
INJECTION SUBCUTANEOUS
COMMUNITY

## 2020-08-01 ENCOUNTER — HOSPITAL ENCOUNTER (OUTPATIENT)
Dept: MRI IMAGING | Age: 35
Discharge: HOME | End: 2020-08-03
Payer: COMMERCIAL

## 2020-08-01 LAB
GFR NON-AFRICAN AMERICAN: 59 ML/MIN
GFR SERPL CREATININE-BSD FRML MDRD: >60 ML/MIN
GFR SERPL CREATININE-BSD FRML MDRD: ABNORMAL ML/MIN/{1.73_M2}
POC CREATININE: 1.06 MG/DL (ref 0.51–1.19)

## 2020-08-01 PROCEDURE — 70546 MR ANGIOGRAPH HEAD W/O&W/DYE: CPT

## 2020-08-01 PROCEDURE — 6360000004 HC RX CONTRAST MEDICATION: Performed by: PSYCHIATRY & NEUROLOGY

## 2020-08-01 PROCEDURE — A9576 INJ PROHANCE MULTIPACK: HCPCS | Performed by: PSYCHIATRY & NEUROLOGY

## 2020-08-01 PROCEDURE — 82565 ASSAY OF CREATININE: CPT

## 2020-08-01 RX ORDER — SODIUM CHLORIDE 0.9 % (FLUSH) 0.9 %
10 SYRINGE (ML) INJECTION PRN
Status: DISCONTINUED | OUTPATIENT
Start: 2020-08-01 | End: 2020-08-04 | Stop reason: HOSPADM

## 2020-08-01 RX ADMIN — GADOTERIDOL 12 ML: 279.3 INJECTION, SOLUTION INTRAVENOUS at 11:07

## 2020-08-10 ENCOUNTER — VIRTUAL VISIT (OUTPATIENT)
Dept: NEUROLOGY | Age: 35
End: 2020-08-10
Payer: COMMERCIAL

## 2020-08-10 PROCEDURE — 99215 OFFICE O/P EST HI 40 MIN: CPT | Performed by: PSYCHIATRY & NEUROLOGY

## 2020-08-10 NOTE — PROGRESS NOTES
mouth 3 times daily as needed for Muscle spasms   Yes Historical Provider, MD   topiramate (TOPAMAX) 100 MG tablet Take 100 mg by mouth 2 times daily   Yes Historical Provider, MD   amitriptyline (ELAVIL) 25 MG tablet Take 1 tab (25 mg) at bedtime for 1 week, then 2 tabs (50 mg) for 1 week, then 3 tabs (75 mg) for 1 week, then 4 tabs (100 mg) thereafter  Patient taking differently: Take 25 mg by mouth nightly Take 1 tab (25 mg) at bedtime for 1 week, then 2 tabs (50 mg) for 1 week, then 3 tabs (75 mg) for 1 week, then 4 tabs (100 mg) thereafter 2/14/18  Yes Ania Whitney MD   FLUoxetine (PROZAC) 40 MG capsule Take 40 mg by mouth daily  7/3/17  Yes Historical Provider, MD   cetirizine (ZYRTEC) 10 MG tablet Take 10 mg by mouth daily  7/4/17  Yes Historical Provider, MD   Docusate Sodium 100 MG TABS Take by mouth 2 times daily   Yes Historical Provider, MD   acetaminophen (ACETAMINOPHEN 8 HOUR) 650 MG extended release tablet Take by mouth 1/7/09  Yes Historical Provider, MD   fluticasone (FLONASE) 50 MCG/ACT nasal spray 1 spray by Nasal route 2 times daily 1/6/17  Yes Nelida Bateman DO   albuterol sulfate  (90 BASE) MCG/ACT inhaler Inhale 2 puffs into the lungs every 4 hours as needed for Wheezing Ventolin HFA 2/29/16  Yes Hieu Garcia MD   famotidine (PEPCID) 20 MG tablet Take 1 tablet by mouth 2 times daily 10/3/17 2/14/18  Phyllis Bateman DO    Scheduled Meds:  Continuous Infusions:  PRN Meds:.  Past Medical History   has a past medical history of Anxiety, Asthma, Depression, GERD (gastroesophageal reflux disease), Hyperlipidemia, Hypertension, Migraines, Moyamoya, Ovarian cyst, PCOS (polycystic ovarian syndrome), Raynaud disease, Stroke ([facility] [address] and Subarachnoid hemorrhage ([facility] [address] Past Surgical History   has a past surgical history that includes Carotid-subclavian Bypass Graft; cyst removal (Right, 2015); Dental surgery; Dilation and curettage of uterus; Tubal ligation;  Cholecystectomy; Intracranial artery angioplasty; Brain aneurysm surgery (06/2016); and other surgical history (11/22/2017). Social History   reports that she quit smoking about 18 years ago. Her smoking use included cigarettes. She has never used smokeless tobacco.   reports no history of alcohol use. reports current drug use. Drug: Marijuana. Family History  family history includes Alcohol Abuse in her father; Cancer in her maternal aunt; Diabetes in her father; Heart Attack in her father and maternal grandmother; High Blood Pressure in her father; Other in her father and paternal grandfather; Stroke in her father. Review of Systems:  CONSTITUTIONAL:  Patient had a recent flu. EYES:  negative for double vision     HEENT:  negative for tinnitus   RESPIRATORY:  negative for cough, shortness of breath    CARDIOVASCULAR:  negative for chest pain, palpitations   GASTROINTESTINAL:  negative for nausea   GENITOURINARY:  negative for hematuria   MUSCULOSKELETAL:  negative for neck pain    NEUROLOGICAL:  See HPI+ occasional gait instability since her aneurysm rupture in 2016   PSYCHIATRIC:  negative for anxiety      Review of systems otherwise negative. OBJECTIVE:   Vitals: There were no vitals taken for this visit. On exam today:   Patient is AAO x3, following commands. CN II-XII grossly intact, pupils 2 mm reactive to light bilaterally. Speech:mild dysarthria  Normal tone in four extremities. Normal sensory exam to LT and pain. Muscle strength is 5/5 in 4 extremities. DTRs : +1 in bilateral biceps and knees. Cerebellar exam: No nystagmus. Lungs sounds clear to auscultation bilaterally. Heart exam: normal S1,S2 sounds,RRR,no murmers. Abdomen was soft, NT,ND with positive bowel sounds. Normal bilateral radial and pedal pulses. Groin: puncture site looked clean, dry, no signs of infection or active oozing nor hematoma.      LABS:   CBC:   Lab Results   Component Value Date    WBC 8.7 09/13/2017    WBC 5.3 03/22/2016 RBC 4.19 09/13/2017    HGB 14.0 09/13/2017    HCT 40.9 09/13/2017    MCV 97.5 09/13/2017    MCH 33.4 09/13/2017    MCHC 34.3 09/13/2017    RDW 13.4 09/13/2017     09/13/2017    MPV 8.0 09/13/2017     BMP:    Lab Results   Component Value Date     09/13/2017    K 4.7 09/13/2017     09/13/2017    CO2 18 09/13/2017    BUN 14 09/13/2017    LABALBU 4.1 09/13/2017    CREATININE 1.06 08/01/2020    CREATININE 1.07 09/13/2017    CALCIUM 9.5 09/13/2017    GFRAA >60 03/22/2016    LABGLOM 59 08/01/2020    GLUCOSE 83 09/13/2017     RADIOLOGY:   Images were personally reviewed including:  Conventional angiogram in 11/22/2017    IMPRESSIONS:   Joaquín CASSIDY Leonardo is a 27 y.o. female with PMH of stroke at age 21 & 19 and TIA at the age of 19. On previous stroke work up ,she was found to have right ICA occlusion, s/p by pass surgery (from radial and temporal artery). 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 Conventional angiogram performed on 11/22/17 showed complete occlusion of previously clipped proximal right ICA, 99% left stenosis and right ECA-ICA bypass. Since her angiogram patient denies developing any new neurological symptoms however she is still complaining of migraine headache for which she is on topamax. The main concern on today's clinic visit is     Headache and Nausea    Two years with no new strokes or hemorrhage      PLANS:     Moyamoya  Left ICA stenosis/string sing/occlusion  Right ICA occlusion s/p right ECA-ICA bypass  Basilar tip aneurysm s/p coil embolization    Continue ASA  Will discuss the patient last angiogram in our multi displenary team meeting. Patient to follow up with Dr Dunia Bruno in January for discussing of any possible intervention. Migraine  Continue topamax(topamax refilled)  Increase the Topamax to 100 mg BID slowly  Referral to headache center    Total Time 40 minutes.  Discussed use, benefit, and side effects of prescribed medications. Personally reviewed imaging with patients and all questions answered. Pt voiced understanding. Patient agreed with treatment plan. Follow up as directed above. If you have any questions, please do not hesitate to call me. I look forward to following Gary Smith      Sincerely,    Yousuf Nicholson  Electronically signed by Yousuf Nicholson MD on 8/10/2020 at 2:57 PM     This is a telehealth visit that was performed with the originating site at Patient Location: Patient Home and Provider Location of [place], [place]. Patient ID verified by me prior to start of this visit. Verbal consent to participate in video visit was obtained. Pursuant to the emergency declaration under the [facility], [facility] waiver authority and the [employer] Act, this Virtual Visit was conducted, with patient's consent, to reduce the patient's risk of exposure to COVID-19 and provide continuity of care for an established/new patient. Services were provided through a video synchronous discussion virtually to substitute for in-person clinic visit. I discussed with the patient the nature of our telehealth visits via interactive/real-time audio/video that:  - I would evaluate the patient and recommend diagnostics and treatments based on my assessment  - Our sessions are not being recorded and that personal health information is protected  - Our team would provide follow up care in person if/when the patient needs it.

## 2020-08-31 ENCOUNTER — HOSPITAL ENCOUNTER (OUTPATIENT)
Dept: MRI IMAGING | Age: 35
Discharge: HOME | End: 2020-09-02
Payer: COMMERCIAL

## 2020-08-31 PROCEDURE — 70551 MRI BRAIN STEM W/O DYE: CPT

## 2020-09-17 ENCOUNTER — TELEPHONE (OUTPATIENT)
Dept: NEUROLOGY | Age: 35
End: 2020-09-17

## 2020-09-17 NOTE — TELEPHONE ENCOUNTER
Patient calling for results of MRI Brain and MRA Head Patient does not have an upcoming appt scheduled Please Advise     MRI Brain:  Impression    Remote right frontal lobe infarcts.         FLAIR signal abnormalities in the periventricular white matter of the frontal    lobes bilaterally that are nonspecific and differential considerations    include a post infectious/inflammatory process, vasculitis, or a    demyelinating process and correlation is needed.  This may also relate to    early sequelae of microvascular disease. MRA Head:  Impression    Unchanged appearance of moyamoya with extensive collateral blood flow.

## 2020-10-15 NOTE — TELEPHONE ENCOUNTER
I did not see the patient before. The MRI findings can be related to history of right ICA occlusion and left ICA stenosis. ?  She possibly has moyamoya. Please add follow-up with me at clinic in 4 to 6 weeks.

## 2021-11-14 ENCOUNTER — HOSPITAL ENCOUNTER (EMERGENCY)
Age: 36
Discharge: HOME | End: 2021-11-14
Attending: EMERGENCY MEDICINE
Payer: MEDICARE

## 2021-11-14 VITALS
BODY MASS INDEX: 28.55 KG/M2 | DIASTOLIC BLOOD PRESSURE: 87 MMHG | OXYGEN SATURATION: 98 % | HEART RATE: 105 BPM | RESPIRATION RATE: 22 BRPM | HEIGHT: 58 IN | SYSTOLIC BLOOD PRESSURE: 142 MMHG | WEIGHT: 136 LBS | TEMPERATURE: 98.2 F

## 2021-11-14 DIAGNOSIS — R11.2 NON-INTRACTABLE VOMITING WITH NAUSEA, UNSPECIFIED VOMITING TYPE: Primary | ICD-10-CM

## 2021-11-14 LAB
ABSOLUTE EOS #: <0.03 K/UL (ref 0–0.44)
ABSOLUTE IMMATURE GRANULOCYTE: 0.08 K/UL (ref 0–0.3)
ABSOLUTE LYMPH #: 2.02 K/UL (ref 1.1–3.7)
ABSOLUTE MONO #: 0.81 K/UL (ref 0.1–1.2)
ANION GAP SERPL CALCULATED.3IONS-SCNC: 17 MMOL/L (ref 9–17)
BASOPHILS # BLD: 0 % (ref 0–2)
BASOPHILS ABSOLUTE: 0.04 K/UL (ref 0–0.2)
BUN BLDV-MCNC: 19 MG/DL (ref 6–20)
BUN/CREAT BLD: 17 (ref 9–20)
CALCIUM SERPL-MCNC: 9.5 MG/DL (ref 8.6–10.4)
CHLORIDE BLD-SCNC: 101 MMOL/L (ref 98–107)
CO2: 18 MMOL/L (ref 20–31)
CREAT SERPL-MCNC: 1.1 MG/DL (ref 0.5–0.9)
DIFFERENTIAL TYPE: ABNORMAL
EOSINOPHILS RELATIVE PERCENT: 0 % (ref 1–4)
GFR AFRICAN AMERICAN: >60 ML/MIN
GFR NON-AFRICAN AMERICAN: 56 ML/MIN
GFR SERPL CREATININE-BSD FRML MDRD: ABNORMAL ML/MIN/{1.73_M2}
GFR SERPL CREATININE-BSD FRML MDRD: ABNORMAL ML/MIN/{1.73_M2}
GLUCOSE BLD-MCNC: 126 MG/DL (ref 70–99)
HCG QUALITATIVE: NEGATIVE
HCT VFR BLD CALC: 47 % (ref 36.3–47.1)
HEMOGLOBIN: 15.4 G/DL (ref 11.9–15.1)
IMMATURE GRANULOCYTES: 1 %
LYMPHOCYTES # BLD: 15 % (ref 24–43)
MCH RBC QN AUTO: 32 PG (ref 25.2–33.5)
MCHC RBC AUTO-ENTMCNC: 32.8 G/DL (ref 28.4–34.8)
MCV RBC AUTO: 97.5 FL (ref 82.6–102.9)
MONOCYTES # BLD: 6 % (ref 3–12)
NRBC AUTOMATED: 0 PER 100 WBC
PDW BLD-RTO: 12.9 % (ref 11.8–14.4)
PLATELET # BLD: 412 K/UL (ref 138–453)
PLATELET ESTIMATE: ABNORMAL
PMV BLD AUTO: 9.1 FL (ref 8.1–13.5)
POTASSIUM SERPL-SCNC: 4.1 MMOL/L (ref 3.7–5.3)
RBC # BLD: 4.82 M/UL (ref 3.95–5.11)
RBC # BLD: ABNORMAL 10*6/UL
SEG NEUTROPHILS: 78 % (ref 36–65)
SEGMENTED NEUTROPHILS ABSOLUTE COUNT: 10.92 K/UL (ref 1.5–8.1)
SODIUM BLD-SCNC: 136 MMOL/L (ref 135–144)
WBC # BLD: 13.9 K/UL (ref 3.5–11.3)
WBC # BLD: ABNORMAL 10*3/UL

## 2021-11-14 PROCEDURE — 85025 COMPLETE CBC W/AUTO DIFF WBC: CPT

## 2021-11-14 PROCEDURE — 6360000002 HC RX W HCPCS: Performed by: EMERGENCY MEDICINE

## 2021-11-14 PROCEDURE — 99283 EMERGENCY DEPT VISIT LOW MDM: CPT

## 2021-11-14 PROCEDURE — 36415 COLL VENOUS BLD VENIPUNCTURE: CPT

## 2021-11-14 PROCEDURE — 2580000003 HC RX 258: Performed by: EMERGENCY MEDICINE

## 2021-11-14 PROCEDURE — 96374 THER/PROPH/DIAG INJ IV PUSH: CPT

## 2021-11-14 PROCEDURE — 84703 CHORIONIC GONADOTROPIN ASSAY: CPT

## 2021-11-14 PROCEDURE — 96376 TX/PRO/DX INJ SAME DRUG ADON: CPT

## 2021-11-14 PROCEDURE — 80048 BASIC METABOLIC PNL TOTAL CA: CPT

## 2021-11-14 PROCEDURE — 96375 TX/PRO/DX INJ NEW DRUG ADDON: CPT

## 2021-11-14 RX ORDER — 0.9 % SODIUM CHLORIDE 0.9 %
1000 INTRAVENOUS SOLUTION INTRAVENOUS ONCE
Status: COMPLETED | OUTPATIENT
Start: 2021-11-14 | End: 2021-11-14

## 2021-11-14 RX ORDER — KETOROLAC TROMETHAMINE 30 MG/ML
30 INJECTION, SOLUTION INTRAMUSCULAR; INTRAVENOUS ONCE
Status: COMPLETED | OUTPATIENT
Start: 2021-11-14 | End: 2021-11-14

## 2021-11-14 RX ORDER — ONDANSETRON 2 MG/ML
4 INJECTION INTRAMUSCULAR; INTRAVENOUS ONCE
Status: COMPLETED | OUTPATIENT
Start: 2021-11-14 | End: 2021-11-14

## 2021-11-14 RX ORDER — ONDANSETRON 4 MG/1
4 TABLET, ORALLY DISINTEGRATING ORAL EVERY 6 HOURS PRN
Qty: 20 TABLET | Refills: 0 | Status: SHIPPED | OUTPATIENT
Start: 2021-11-14

## 2021-11-14 RX ORDER — DICYCLOMINE HYDROCHLORIDE 10 MG/1
10 CAPSULE ORAL EVERY 6 HOURS PRN
Qty: 20 CAPSULE | Refills: 0 | Status: SHIPPED | OUTPATIENT
Start: 2021-11-14

## 2021-11-14 RX ADMIN — SODIUM CHLORIDE 1000 ML: 9 INJECTION, SOLUTION INTRAVENOUS at 19:27

## 2021-11-14 RX ADMIN — ONDANSETRON 4 MG: 2 INJECTION INTRAMUSCULAR; INTRAVENOUS at 18:20

## 2021-11-14 RX ADMIN — SODIUM CHLORIDE 1000 ML: 9 INJECTION, SOLUTION INTRAVENOUS at 18:20

## 2021-11-14 RX ADMIN — ONDANSETRON 4 MG: 2 INJECTION INTRAMUSCULAR; INTRAVENOUS at 19:28

## 2021-11-14 RX ADMIN — KETOROLAC TROMETHAMINE 30 MG: 30 INJECTION, SOLUTION INTRAMUSCULAR at 20:31

## 2021-11-14 ASSESSMENT — ENCOUNTER SYMPTOMS
DIARRHEA: 0
COLOR CHANGE: 0
EYE REDNESS: 0
FACIAL SWELLING: 0
SHORTNESS OF BREATH: 0
EYE DISCHARGE: 0
CONSTIPATION: 0
VOMITING: 0
COUGH: 0
ABDOMINAL PAIN: 0

## 2021-11-14 ASSESSMENT — PAIN SCALES - GENERAL
PAINLEVEL_OUTOF10: 10
PAINLEVEL_OUTOF10: 5
PAINLEVEL_OUTOF10: 10

## 2021-11-14 ASSESSMENT — PAIN DESCRIPTION - PAIN TYPE: TYPE: ACUTE PAIN

## 2021-11-14 ASSESSMENT — PAIN DESCRIPTION - DESCRIPTORS: DESCRIPTORS: CRAMPING

## 2021-11-14 ASSESSMENT — PAIN DESCRIPTION - FREQUENCY
FREQUENCY: CONTINUOUS
FREQUENCY: CONTINUOUS

## 2021-11-14 ASSESSMENT — PAIN DESCRIPTION - LOCATION
LOCATION: ABDOMEN
LOCATION: ABDOMEN

## 2021-11-14 NOTE — ED PROVIDER NOTES
[address] [place] [address] ED  EMERGENCY DEPARTMENT ENCOUNTER      Pt Name: Rajinder Sosa  MRN: [id number]  [place] 1985  Date of evaluation: 11/14/2021  Provider: Bruce Ruffin MD    [address] [place] [address]       Chief Complaint   Patient presents with    Nausea     x Friday    Emesis     x Friday; states 15 x today    Headache     x yesterday    Diarrhea     x yesterday         HISTORY OF PRESENT ILLNESS  (Location/Symptom, Timing/Onset, Context/Setting, Quality, Duration, Modifying Factors, Severity.)   Rajinder Sosa is a 43 y.o. female who presents to the emergency department for nausea and vomiting. This started about 48 hours ago. According to her E HR she has a history of cannabinoid hyperemesis syndrome. She continues to smoke marijuana daily. No hematemesis or fever. No cough or chest pain or shortness of breath. She rated her pain as a 10      Nursing Notes were reviewed.     ALLERGIES     Cymbalta [duloxetine hcl], Duloxetine, Percocet [oxycodone-acetaminophen], and Benzonatate    CURRENT MEDICATIONS       Previous Medications    ACETAMINOPHEN (ACETAMINOPHEN 8 HOUR) 650 MG EXTENDED RELEASE TABLET    Take by mouth    ALBUTEROL SULFATE  (90 BASE) MCG/ACT INHALER    Inhale 2 puffs into the lungs every 4 hours as needed for Wheezing Ventolin HFA    AMITRIPTYLINE (ELAVIL) 25 MG TABLET    Take 1 tab (25 mg) at bedtime for 1 week, then 2 tabs (50 mg) for 1 week, then 3 tabs (75 mg) for 1 week, then 4 tabs (100 mg) thereafter    ASPIRIN 325 MG TABLET    Take 325 mg by mouth daily    BENAZEPRIL (LOTENSIN) 10 MG TABLET    Take 10 mg by mouth daily    BUTALBITAL-ACETAMINOPHEN-CAFFEINE (FIORICET, ESGIC) -40 MG PER TABLET    Take 1 tablet by mouth every 6 hours as needed for Headaches    CETIRIZINE (ZYRTEC) 10 MG TABLET    Take 10 mg by mouth daily     CYCLOBENZAPRINE (FLEXERIL) 5 MG TABLET    Take 5 mg by mouth 3 times daily as needed for Muscle spasms    DOCUSATE SODIUM 100 MG TABS    Take by mouth 2 times daily    ERENUMAB-AOOE (AIMOVIG) 70 MG/ML SOAJ    Inject into the skin    FAMOTIDINE (PEPCID) 20 MG TABLET    Take 1 tablet by mouth 2 times daily    FLUOXETINE (PROZAC) 40 MG CAPSULE    Take 40 mg by mouth daily     FLUTICASONE (FLONASE) 50 MCG/ACT NASAL SPRAY    1 spray by Nasal route 2 times daily    MAGNESIUM OXIDE (MAG-OX) 400 (241.3 MG) MG TABS TABLET    TAKE ONE TABLET BY MOUTH ONCE DAILY    TIZANIDINE (ZANAFLEX) 4 MG TABLET    Take 4 mg by mouth every 6 hours as needed    TOPIRAMATE (TOPAMAX) 100 MG TABLET    Take 100 mg by mouth 2 times daily       PAST MEDICAL HISTORY         Diagnosis Date    Anxiety     Asthma     Depression     GERD (gastroesophageal reflux disease)     Hyperlipidemia     Hypertension     Migraines     Moyamoya     Ovarian cyst     PCOS (polycystic ovarian syndrome)     Raynaud disease     Stroke (HCC)     x4    Subarachnoid hemorrhage (HCC)        SURGICAL HISTORY           Procedure Laterality Date    BRAIN ANEURYSM SURGERY  06/2016    OP REPORT ATTACHED TO MRI, [employer], MRI CONDITIONAL 3T OK.  CAROTID-SUBCLAVIAN BYPASS GRAFT      right side     CHOLECYSTECTOMY      CYST REMOVAL Right 2015    ovarian right side     DENTAL SURGERY      all top teeth    DILATION AND CURETTAGE OF UTERUS      novasure ablation    INTRACRANIAL ARTERY ANGIOPLASTY      temporal artery removed    OTHER SURGICAL HISTORY  11/22/2017    cerebral angio    OTHER SURGICAL HISTORY  2003    PT HAS UNKNOWN CLIP IN NECK, ONLY 1.5 T.  PER DR TEN LAKES [place]    TUBAL LIGATION           FAMILY HISTORY           Problem Relation Age of Onset    Diabetes Father     Stroke Father     Heart Attack Father     Alcohol Abuse Father     High Blood Pressure Father     Other Father         pre caner lumps in testicules     Heart Attack Maternal Grandmother     Other Paternal Grandfather         aneurysm     Cancer Maternal Aunt         lung     Family Status   Relation Name Status    Father Alive    Mother  Alive    [place]  (Not Specified)    PGF  (Not Specified)    MAunt  (Not Specified)        SOCIAL HISTORY      reports that she quit smoking about 19 years ago. Her smoking use included cigarettes. She has never used smokeless tobacco. She reports current drug use. Drug: Marijuana Estil Catena). She reports that she does not drink alcohol. REVIEW OF SYSTEMS    (2-9 systems for level 4, 10 or more for level 5)     Review of Systems   Constitutional: Negative for chills, fatigue and fever. HENT: Negative for congestion, ear discharge and facial swelling. Eyes: Negative for discharge and redness. Respiratory: Negative for cough and shortness of breath. Cardiovascular: Negative for chest pain. Gastrointestinal: Negative for abdominal pain, constipation, diarrhea and vomiting. Genitourinary: Negative for dysuria and hematuria. Musculoskeletal: Negative for arthralgias. Skin: Negative for color change and rash. Neurological: Negative for syncope, numbness and headaches. Hematological: Negative for adenopathy. Psychiatric/Behavioral: Negative for confusion. The patient is not nervous/anxious. Except as noted above the remainder of the review of systems was reviewed and negative. PHYSICAL EXAM    (up to 7 for level 4, 8 or more for level 5)     Vitals:    11/14/21 1720 11/14/21 1846 11/14/21 1900   BP: (!) 165/130 (!) 145/90 (!) 142/87   Pulse: 105     Resp: 22     Temp: 98.2 °F (36.8 °C)     TempSrc: Oral     SpO2: 94%  98%   Weight: 136 lb (61.7 kg)     Height: 4' 10\" (1.473 m)         Physical Exam  Constitutional:       General: She is not in acute distress. Appearance: She is well-developed. She is not diaphoretic. HENT:      Head: Normocephalic and atraumatic. Eyes:      General: No scleral icterus. Right eye: No discharge. Left eye: No discharge. Cardiovascular:      Rate and Rhythm: Normal rate and regular rhythm.    Pulmonary:      Effort: Pulmonary effort is normal. No respiratory distress. Breath sounds: Normal breath sounds. No stridor. No wheezing or rales. Abdominal:      General: Bowel sounds are normal. There is no distension. Palpations: Abdomen is soft. Tenderness: There is no abdominal tenderness. Musculoskeletal:         General: Normal range of motion. Cervical back: Neck supple. Lymphadenopathy:      Cervical: No cervical adenopathy. Skin:     General: Skin is warm and dry. Findings: No erythema or rash. Neurological:      Mental Status: She is alert and oriented to person, place, and time. Psychiatric:         Behavior: Behavior normal.             DIAGNOSTIC RESULTS     EKG: All EKG's are interpreted by the Emergency Department Physician who either signs or Co-signs this chart in the absence of a cardiologist.    Not indicated    RADIOLOGY:   Non-plain film images such as CT, Ultrasound and MRI are read by the radiologist. Plain radiographic images are visualized and preliminarily interpreted by the emergency physician with the below findings:    Not indicated    Interpretation per the Radiologist below, if available at the time of this note:        LABS:  Labs Reviewed   CBC WITH AUTO DIFFERENTIAL - Abnormal; Notable for the following components:       Result Value    WBC 13.9 (*)     Hemoglobin 15.4 (*)     Seg Neutrophils 78 (*)     Lymphocytes 15 (*)     Eosinophils % 0 (*)     Immature Granulocytes 1 (*)     Segs Absolute 10.92 (*)     All other components within normal limits   BASIC METABOLIC PANEL - Abnormal; Notable for the following components:    Glucose 126 (*)     CREATININE 1.10 (*)     CO2 18 (*)     GFR Non- 56 (*)     All other components within normal limits   HCG, SERUM, QUALITATIVE       All other labs were within normal range or not returned as of this dictation.     EMERGENCY DEPARTMENT COURSE and DIFFERENTIAL DIAGNOSIS/MDM:   Vitals:    Vitals:    11/14/21 1720 11/14/21 1846 11/14/21 1900   BP: (!) 165/130 (!) 145/90 (!) 142/87   Pulse: 105     Resp: 22     Temp: 98.2 °F (36.8 °C)     TempSrc: Oral     SpO2: 94%  98%   Weight: 136 lb (61.7 kg)     Height: 4' 10\" (1.473 m)         Orders Placed This Encounter   Medications    ondansetron (ZOFRAN) injection 4 mg    0.9 % sodium chloride bolus    ondansetron (ZOFRAN) injection 4 mg    0.9 % sodium chloride bolus    ketorolac (TORADOL) injection 30 mg    dicyclomine (BENTYL) 10 MG capsule     Sig: Take 1 capsule by mouth every 6 hours as needed (cramps)     Dispense:  20 capsule     Refill:  0    ondansetron (ZOFRAN ODT) 4 MG disintegrating tablet     Sig: Take 1 tablet by mouth every 6 hours as needed for Nausea or Vomiting     Dispense:  20 tablet     Refill:  0       Medical Decision Making: She was given IV fluids and IV Zofran and is feeling improved. She was also given IV Toradol for a headache. This has been a recurring issue for her and she has a gastroenterologist that she can follow-up with. Treatment diagnosis and follow-up were discussed thoroughly with the patient. She is feeling improved. CONSULTS:  None    PROCEDURES:  None    FINAL IMPRESSION      1.  Non-intractable vomiting with nausea, unspecified vomiting type          DISPOSITION/PLAN   DISPOSITION Decision To Discharge 11/14/2021 07:46:51 PM      PATIENT REFERRED TO:   Elizabeth Stephenson MD  [facility] [address] [facility] [address] [facility]  [phone]    In 2 days      [facility] ED  [address] [facility]  [phone]    If symptoms worsen      DISCHARGE MEDICATIONS:     New Prescriptions    DICYCLOMINE (BENTYL) 10 MG CAPSULE    Take 1 capsule by mouth every 6 hours as needed (cramps)    ONDANSETRON (ZOFRAN ODT) 4 MG DISINTEGRATING TABLET    Take 1 tablet by mouth every 6 hours as needed for Nausea or Vomiting       The care is provided during an unprecedented national emergency due to the novel coronavirus, COVID-19.     (Please note that portions of this note were completed with a voice recognition program.  Efforts were made to edit the dictations but occasionally words are mis-transcribed.)    Abbe Rodriguez MD  Attending Emergency Physician           Abbe Rodriguez MD  11/14/21 6713

## 2022-03-24 ENCOUNTER — HOSPITAL ENCOUNTER (EMERGENCY)
Age: 37
Discharge: HOME | End: 2022-03-24
Attending: EMERGENCY MEDICINE
Payer: MEDICARE

## 2022-03-24 VITALS
WEIGHT: 140 LBS | BODY MASS INDEX: 29.39 KG/M2 | HEART RATE: 116 BPM | DIASTOLIC BLOOD PRESSURE: 133 MMHG | HEIGHT: 58 IN | RESPIRATION RATE: 18 BRPM | TEMPERATURE: 97.9 F | OXYGEN SATURATION: 96 % | SYSTOLIC BLOOD PRESSURE: 153 MMHG

## 2022-03-24 DIAGNOSIS — R11.2 NON-INTRACTABLE VOMITING WITH NAUSEA, UNSPECIFIED VOMITING TYPE: Primary | ICD-10-CM

## 2022-03-24 LAB
ABSOLUTE EOS #: 0.04 K/UL (ref 0–0.44)
ABSOLUTE IMMATURE GRANULOCYTE: 0.04 K/UL (ref 0–0.3)
ABSOLUTE LYMPH #: 2.1 K/UL (ref 1.1–3.7)
ABSOLUTE MONO #: 0.77 K/UL (ref 0.1–1.2)
ALBUMIN SERPL-MCNC: 4.9 G/DL (ref 3.5–5.2)
ALP BLD-CCNC: 128 U/L (ref 35–104)
ALT SERPL-CCNC: 38 U/L (ref 5–33)
AMYLASE: 79 U/L (ref 28–100)
ANION GAP SERPL CALCULATED.3IONS-SCNC: 14 MMOL/L (ref 9–17)
AST SERPL-CCNC: 39 U/L
BASOPHILS # BLD: 0 % (ref 0–2)
BASOPHILS ABSOLUTE: 0.03 K/UL (ref 0–0.2)
BILIRUB SERPL-MCNC: 0.61 MG/DL (ref 0.3–1.2)
BUN BLDV-MCNC: 20 MG/DL (ref 6–20)
BUN/CREAT BLD: 20 (ref 9–20)
CALCIUM SERPL-MCNC: 10 MG/DL (ref 8.6–10.4)
CHLORIDE BLD-SCNC: 102 MMOL/L (ref 98–107)
CO2: 21 MMOL/L (ref 20–31)
CREAT SERPL-MCNC: 0.99 MG/DL (ref 0.5–0.9)
EOSINOPHILS RELATIVE PERCENT: 0 % (ref 1–4)
GFR AFRICAN AMERICAN: >60 ML/MIN
GFR NON-AFRICAN AMERICAN: >60 ML/MIN
GFR SERPL CREATININE-BSD FRML MDRD: ABNORMAL ML/MIN/{1.73_M2}
GLUCOSE BLD-MCNC: 98 MG/DL (ref 70–99)
HCT VFR BLD CALC: 47.7 % (ref 36.3–47.1)
HEMOGLOBIN: 16.2 G/DL (ref 11.9–15.1)
IMMATURE GRANULOCYTES: 0 %
LIPASE: 49 U/L (ref 13–60)
LYMPHOCYTES # BLD: 20 % (ref 24–43)
MCH RBC QN AUTO: 32.6 PG (ref 25.2–33.5)
MCHC RBC AUTO-ENTMCNC: 34 G/DL (ref 28.4–34.8)
MCV RBC AUTO: 96 FL (ref 82.6–102.9)
MONOCYTES # BLD: 7 % (ref 3–12)
NRBC AUTOMATED: 0 PER 100 WBC
PDW BLD-RTO: 12.6 % (ref 11.8–14.4)
PLATELET # BLD: 341 K/UL (ref 138–453)
PMV BLD AUTO: 8.8 FL (ref 8.1–13.5)
POTASSIUM SERPL-SCNC: 4.4 MMOL/L (ref 3.7–5.3)
RBC # BLD: 4.97 M/UL (ref 3.95–5.11)
SEG NEUTROPHILS: 73 % (ref 36–65)
SEGMENTED NEUTROPHILS ABSOLUTE COUNT: 7.79 K/UL (ref 1.5–8.1)
SODIUM BLD-SCNC: 137 MMOL/L (ref 135–144)
TOTAL PROTEIN: 8.3 G/DL (ref 6.4–8.3)
WBC # BLD: 10.8 K/UL (ref 3.5–11.3)

## 2022-03-24 PROCEDURE — 2580000003 HC RX 258: Performed by: EMERGENCY MEDICINE

## 2022-03-24 PROCEDURE — A4216 STERILE WATER/SALINE, 10 ML: HCPCS | Performed by: EMERGENCY MEDICINE

## 2022-03-24 PROCEDURE — 85025 COMPLETE CBC W/AUTO DIFF WBC: CPT

## 2022-03-24 PROCEDURE — 96375 TX/PRO/DX INJ NEW DRUG ADDON: CPT

## 2022-03-24 PROCEDURE — 6360000002 HC RX W HCPCS: Performed by: EMERGENCY MEDICINE

## 2022-03-24 PROCEDURE — 2500000003 HC RX 250 WO HCPCS: Performed by: EMERGENCY MEDICINE

## 2022-03-24 PROCEDURE — 96365 THER/PROPH/DIAG IV INF INIT: CPT

## 2022-03-24 PROCEDURE — 99282 EMERGENCY DEPT VISIT SF MDM: CPT

## 2022-03-24 PROCEDURE — 83690 ASSAY OF LIPASE: CPT

## 2022-03-24 PROCEDURE — 82150 ASSAY OF AMYLASE: CPT

## 2022-03-24 PROCEDURE — 80053 COMPREHEN METABOLIC PANEL: CPT

## 2022-03-24 RX ORDER — 0.9 % SODIUM CHLORIDE 0.9 %
1000 INTRAVENOUS SOLUTION INTRAVENOUS ONCE
Status: COMPLETED | OUTPATIENT
Start: 2022-03-24 | End: 2022-03-24

## 2022-03-24 RX ORDER — ONDANSETRON 2 MG/ML
4 INJECTION INTRAMUSCULAR; INTRAVENOUS ONCE
Status: COMPLETED | OUTPATIENT
Start: 2022-03-24 | End: 2022-03-24

## 2022-03-24 RX ADMIN — SODIUM CHLORIDE 1000 ML: 9 INJECTION, SOLUTION INTRAVENOUS at 13:41

## 2022-03-24 RX ADMIN — PROMETHAZINE HYDROCHLORIDE 12.5 MG: 25 INJECTION INTRAMUSCULAR; INTRAVENOUS at 13:40

## 2022-03-24 RX ADMIN — FAMOTIDINE 20 MG: 10 INJECTION, SOLUTION INTRAVENOUS at 13:41

## 2022-03-24 RX ADMIN — ONDANSETRON 4 MG: 2 INJECTION INTRAMUSCULAR; INTRAVENOUS at 13:41

## 2022-03-24 ASSESSMENT — ENCOUNTER SYMPTOMS
BACK PAIN: 0
ABDOMINAL DISTENTION: 0
EYE DISCHARGE: 0
DIARRHEA: 1
VOMITING: 1
NAUSEA: 1
EYE PAIN: 0
SHORTNESS OF BREATH: 0
FACIAL SWELLING: 0
ABDOMINAL PAIN: 1
CHEST TIGHTNESS: 0

## 2022-03-24 ASSESSMENT — PAIN - FUNCTIONAL ASSESSMENT: PAIN_FUNCTIONAL_ASSESSMENT: 0-10

## 2022-03-24 ASSESSMENT — PAIN SCALES - GENERAL: PAINLEVEL_OUTOF10: 9

## 2022-03-24 NOTE — ED PROVIDER NOTES
EMERGENCY DEPARTMENT ENCOUNTER    Pt Name: Elidia Jones  MRN: [id number]  [place] 1985  Date of evaluation: 3/24/22  CHIEF COMPLAINT       Chief Complaint   Patient presents with    Abdominal Pain     x 3 days    Emesis     HISTORY OF PRESENT ILLNESS   HPI   The patient is a 77-year-old female who presented to the emergency department secondary to abdominal pain, nausea, vomiting and diarrhea. Patient complains of a 2-day history of nausea and vomiting, she stated symptoms began after eating a steak [employer] restaurant. She says since then she has had several episodes of nonbilious nonbloody vomitus and unable to tolerate any food. She has had several episodes of watery nonbloody diarrhea. No recent antibiotic use or travel outside the country. She denied a previous history of IBS, Crohn's, diverticulitis or pancreatitis. She denies alcohol use. She is complaining of cramping sensation in her abdomen 9 out of 10 on the pain scale. She denied hematuria dysuria. Patient had chest pain, shortness of breath, fevers or chills    REVIEW OF SYSTEMS     Review of Systems   Constitutional: Negative for chills, diaphoresis and fever. HENT: Negative for congestion, ear pain and facial swelling. Eyes: Negative for pain, discharge and visual disturbance. Respiratory: Negative for chest tightness and shortness of breath. Cardiovascular: Negative for chest pain and palpitations. Gastrointestinal: Positive for abdominal pain, diarrhea, nausea and vomiting. Negative for abdominal distention. Genitourinary: Negative for difficulty urinating and flank pain. Musculoskeletal: Negative for back pain. Skin: Negative for wound. Neurological: Negative for dizziness, light-headedness and headaches.      PASTMEDICAL HISTORY     Past Medical History:   Diagnosis Date    Anxiety     Asthma     Depression     GERD (gastroesophageal reflux disease)     Hyperlipidemia     Hypertension     Migraines  Moyamoya     Ovarian cyst     PCOS (polycystic ovarian syndrome)     Raynaud disease     Stroke ([facility] [address]     x4    Subarachnoid hemorrhage (HCC)      SURGICAL HISTORY       Past Surgical History:   Procedure Laterality Date    BRAIN ANEURYSM SURGERY  06/2016    OP REPORT ATTACHED TO MRI, [employer]IL[employer], MRI CONDITIONAL 3T OK.  CAROTID-SUBCLAVIAN BYPASS GRAFT      right side     CHOLECYSTECTOMY      CYST REMOVAL Right 2015    ovarian right side     DENTAL SURGERY      all top teeth    DILATION AND CURETTAGE OF UTERUS      novasure ablation    INTRACRANIAL ARTERY ANGIOPLASTY      temporal artery removed    OTHER SURGICAL HISTORY  11/22/2017    cerebral angio    OTHER SURGICAL HISTORY  2003    PT HAS UNKNOWN CLIP IN NECK, ONLY 1.5 T.  PER DR [facility]    TUBAL LIGATION       CURRENT MEDICATIONS       Previous Medications    ACETAMINOPHEN (ACETAMINOPHEN 8 HOUR) 650 MG EXTENDED RELEASE TABLET    Take by mouth    ALBUTEROL SULFATE  (90 BASE) MCG/ACT INHALER    Inhale 2 puffs into the lungs every 4 hours as needed for Wheezing Ventolin HFA    AMITRIPTYLINE (ELAVIL) 25 MG TABLET    Take 1 tab (25 mg) at bedtime for 1 week, then 2 tabs (50 mg) for 1 week, then 3 tabs (75 mg) for 1 week, then 4 tabs (100 mg) thereafter    ASPIRIN 325 MG TABLET    Take 325 mg by mouth daily    BENAZEPRIL (LOTENSIN) 10 MG TABLET    Take 10 mg by mouth daily    BUTALBITAL-ACETAMINOPHEN-CAFFEINE (FIORICET, ESGIC) -40 MG PER TABLET    Take 1 tablet by mouth every 6 hours as needed for Headaches    CETIRIZINE (ZYRTEC) 10 MG TABLET    Take 10 mg by mouth daily     CYCLOBENZAPRINE (FLEXERIL) 5 MG TABLET    Take 5 mg by mouth 3 times daily as needed for Muscle spasms    DICYCLOMINE (BENTYL) 10 MG CAPSULE    Take 1 capsule by mouth every 6 hours as needed (cramps)    DOCUSATE SODIUM 100 MG TABS    Take by mouth 2 times daily    ERENUMAB-AOOE (AIMOVIG) 70 MG/ML SOAJ    Inject into the skin    FAMOTIDINE (PEPCID) 20 MG TABLET    Take 1 tablet by mouth 2 times daily    FLUOXETINE (PROZAC) 40 MG CAPSULE    Take 40 mg by mouth daily     FLUTICASONE (FLONASE) 50 MCG/ACT NASAL SPRAY    1 spray by Nasal route 2 times daily    MAGNESIUM OXIDE (MAG-OX) 400 (241.3 MG) MG TABS TABLET    TAKE ONE TABLET BY MOUTH ONCE DAILY    ONDANSETRON (ZOFRAN ODT) 4 MG DISINTEGRATING TABLET    Take 1 tablet by mouth every 6 hours as needed for Nausea or Vomiting    TIZANIDINE (ZANAFLEX) 4 MG TABLET    Take 4 mg by mouth every 6 hours as needed    TOPIRAMATE (TOPAMAX) 100 MG TABLET    Take 100 mg by mouth 2 times daily     ALLERGIES     is allergic to cymbalta [duloxetine hcl], duloxetine, percocet [oxycodone-acetaminophen], and benzonatate. FAMILY HISTORY     She indicated that her mother is alive. She indicated that her father is alive. She indicated that the status of her maternal grandmother is unknown. She indicated that the status of her paternal grandfather is unknown. She indicated that the status of her maternal aunt is unknown. SOCIAL HISTORY       Social History     Tobacco Use    Smoking status: Former Smoker     Types: Cigarettes     Quit date: 2002     Years since quittin.2    Smokeless tobacco: Never Used    Tobacco comment: caffeine: none   Vaping Use    Vaping Use: Never used   Substance Use Topics    Alcohol use: No     Alcohol/week: 0.0 standard drinks    Drug use: Yes     Types: Marijuana (Weed)     Comment: occassionally     PHYSICAL EXAM     INITIAL VITALS: BP (!) 153/133   Pulse 116   Temp 97.9 °F (36.6 °C)   Resp 18   Ht 4' 10\" (1.473 m)   Wt 140 lb (63.5 kg)   SpO2 96%   BMI 29.26 kg/m²    Physical Exam  Vitals and nursing note reviewed. Constitutional:       General: She is not in acute distress. Appearance: She is well-developed. She is not diaphoretic. HENT:      Head: Normocephalic and atraumatic. Eyes:      Pupils: Pupils are equal, round, and reactive to light.    Cardiovascular:      Rate and Rhythm: Normal rate and regular rhythm. Pulmonary:      Effort: Pulmonary effort is normal.      Breath sounds: Normal breath sounds. Abdominal:      General: Bowel sounds are normal.      Palpations: Abdomen is soft. Tenderness: There is no abdominal tenderness. There is no guarding or rebound. Musculoskeletal:         General: Normal range of motion. Cervical back: Normal range of motion and neck supple. Skin:     General: Skin is warm. Capillary Refill: Capillary refill takes less than 2 seconds. Neurological:      Mental Status: She is alert and oriented to person, place, and time. MEDICAL DECISION MAKING:   The patient is a 39-year-old female who presented to the emergency department secondary to abdominal pain with nausea and vomiting. Orders for labs, IV fluids as well as antiemetics. Patient will be reevaluated. No acute findings on laboratory analysis, abdomen soft nontender no peritoneal signs. Patient was able to tolerate oral.  Patient is discharged home with outpatient follow-up she has Zofran at home. Given a work note for return to work on Saturday and parameters to return to the emergency department         All patient's question's and concerns were answered prior to disposition and patient and/or family expressed understanding and agreement of treatment plan. CRITICAL CARE:              NIH STROKE SCALE:            PROCEDURES:    Procedures    DIAGNOSTIC RESULTS   EKG:All EKG's are interpreted by the Emergency Department Physician who either signs or Co-signs this chart in the absence of a cardiologist.        RADIOLOGY:All plain film, CT, MRI, and formal ultrasound images (except ED bedside ultrasound) are read by the radiologist, see reports below, unless otherwisenoted in MDM or here. No orders to display     LABS: All lab results were reviewed by myself, and all abnormals are listed below.   Labs Reviewed   CBC WITH AUTO DIFFERENTIAL - Abnormal; Notable for the following components:       Result Value    Hemoglobin 16.2 (*)     Hematocrit 47.7 (*)     Seg Neutrophils 73 (*)     Lymphocytes 20 (*)     Eosinophils % 0 (*)     All other components within normal limits   COMPREHENSIVE METABOLIC PANEL W/ REFLEX TO MG FOR LOW K - Abnormal; Notable for the following components:    CREATININE 0.99 (*)     Alkaline Phosphatase 128 (*)     ALT 38 (*)     AST 39 (*)     All other components within normal limits   LIPASE   AMYLASE       EMERGENCY DEPARTMENTCOURSE:         Vitals:    Vitals:    03/24/22 1243   BP: (!) 153/133   Pulse: 116   Resp: 18   Temp: 97.9 °F (36.6 °C)   SpO2: 96%   Weight: 140 lb (63.5 kg)   Height: 4' 10\" (1.473 m)       The patient was given the following medications while in the emergency department:  Orders Placed This Encounter   Medications    0.9 % sodium chloride bolus    ondansetron (ZOFRAN) injection 4 mg    famotidine (PEPCID) 20 mg in sodium chloride (PF) 10 mL injection    promethazine (PHENERGAN) 12.5 mg in sodium chloride 0.9 % 50 mL IVPB     CONSULTS:  None    FINAL IMPRESSION      1. Non-intractable vomiting with nausea, unspecified vomiting type          DISPOSITION/PLAN   DISPOSITION Decision To Discharge 03/24/2022 02:55:57 PM      PATIENT REFERRED TO:  Mary Carmen Naun, [facility] [place] [id number] [facility]  [phone]          DISCHARGE MEDICATIONS:  New Prescriptions    No medications on file     Ilya Zheng MD  Attending Emergency Physician      The care is provided during an unprecedented national emergency due to the novel coronavirus, COVID 19. This note was created with the assistance of a speech-recognition program. While intending to generate a document that actually reflects the content of the visit, no guarantees can be provided that every mistake has been identified and corrected by editing.     Janet Alford MD  17/56/57 0994

## 2022-03-24 NOTE — Clinical Note
José Miguel Lerma was seen and treated in our emergency department on 3/24/2022. She may return to work on 03/26/2022. If you have any questions or concerns, please don't hesitate to call.       Moses Webster MD

## 2022-06-02 ENCOUNTER — HOSPITAL ENCOUNTER (OUTPATIENT)
Dept: MRI IMAGING | Age: 37
Discharge: HOME | End: 2022-06-04
Payer: MEDICARE

## 2022-06-02 DIAGNOSIS — I67.89 ACUTE, BUT ILL-DEFINED, CEREBROVASCULAR DISEASE: ICD-10-CM

## 2022-06-02 DIAGNOSIS — I67.5 MOYAMOYA DISEASE: ICD-10-CM

## 2022-06-02 DIAGNOSIS — I63.9 CEREBROVASCULAR ACCIDENT (CVA), UNSPECIFIED MECHANISM (HCC): ICD-10-CM

## 2022-06-02 PROCEDURE — 70544 MR ANGIOGRAPHY HEAD W/O DYE: CPT

## 2022-06-02 PROCEDURE — 6360000004 HC RX CONTRAST MEDICATION: Performed by: PSYCHIATRY & NEUROLOGY

## 2022-06-02 PROCEDURE — 70553 MRI BRAIN STEM W/O & W/DYE: CPT

## 2022-06-02 PROCEDURE — A9579 GAD-BASE MR CONTRAST NOS,1ML: HCPCS | Performed by: PSYCHIATRY & NEUROLOGY

## 2022-06-02 RX ADMIN — GADOTERIDOL 12 ML: 279.3 INJECTION, SOLUTION INTRAVENOUS at 17:20

## 2022-09-09 ENCOUNTER — APPOINTMENT (OUTPATIENT)
Dept: CT IMAGING | Age: 37
End: 2022-09-09
Payer: MEDICARE

## 2022-09-09 ENCOUNTER — HOSPITAL ENCOUNTER (EMERGENCY)
Age: 37
Discharge: HOME | End: 2022-09-09
Attending: EMERGENCY MEDICINE
Payer: MEDICARE

## 2022-09-09 VITALS
HEIGHT: 58 IN | OXYGEN SATURATION: 99 % | DIASTOLIC BLOOD PRESSURE: 102 MMHG | WEIGHT: 138 LBS | HEART RATE: 105 BPM | RESPIRATION RATE: 14 BRPM | SYSTOLIC BLOOD PRESSURE: 165 MMHG | TEMPERATURE: 98.9 F | BODY MASS INDEX: 28.97 KG/M2

## 2022-09-09 DIAGNOSIS — R51.9 NONINTRACTABLE HEADACHE, UNSPECIFIED CHRONICITY PATTERN, UNSPECIFIED HEADACHE TYPE: Primary | ICD-10-CM

## 2022-09-09 LAB
ABSOLUTE EOS #: 0.05 K/UL (ref 0–0.44)
ABSOLUTE IMMATURE GRANULOCYTE: 0.07 K/UL (ref 0–0.3)
ABSOLUTE LYMPH #: 2.36 K/UL (ref 1.1–3.7)
ABSOLUTE MONO #: 0.77 K/UL (ref 0.1–1.2)
ANION GAP SERPL CALCULATED.3IONS-SCNC: 19 MMOL/L (ref 9–17)
BASOPHILS # BLD: 0 % (ref 0–2)
BASOPHILS ABSOLUTE: 0.04 K/UL (ref 0–0.2)
BUN BLDV-MCNC: 16 MG/DL (ref 6–20)
BUN/CREAT BLD: 14 (ref 9–20)
CALCIUM SERPL-MCNC: 9.7 MG/DL (ref 8.6–10.4)
CHLORIDE BLD-SCNC: 104 MMOL/L (ref 98–107)
CO2: 14 MMOL/L (ref 20–31)
CREAT SERPL-MCNC: 1.15 MG/DL (ref 0.5–0.9)
EOSINOPHILS RELATIVE PERCENT: 0 % (ref 1–4)
GFR AFRICAN AMERICAN: >60 ML/MIN
GFR NON-AFRICAN AMERICAN: 53 ML/MIN
GFR SERPL CREATININE-BSD FRML MDRD: ABNORMAL ML/MIN/{1.73_M2}
GLUCOSE BLD-MCNC: 124 MG/DL (ref 70–99)
HCT VFR BLD CALC: 49.8 % (ref 36.3–47.1)
HEMOGLOBIN: 15.9 G/DL (ref 11.9–15.1)
IMMATURE GRANULOCYTES: 1 %
LYMPHOCYTES # BLD: 19 % (ref 24–43)
MCH RBC QN AUTO: 31.5 PG (ref 25.2–33.5)
MCHC RBC AUTO-ENTMCNC: 31.9 G/DL (ref 28.4–34.8)
MCV RBC AUTO: 98.8 FL (ref 82.6–102.9)
MONOCYTES # BLD: 6 % (ref 3–12)
NRBC AUTOMATED: 0 PER 100 WBC
PDW BLD-RTO: 13.1 % (ref 11.8–14.4)
PLATELET # BLD: 359 K/UL (ref 138–453)
PMV BLD AUTO: 9 FL (ref 8.1–13.5)
POTASSIUM SERPL-SCNC: 4.1 MMOL/L (ref 3.7–5.3)
RBC # BLD: 5.04 M/UL (ref 3.95–5.11)
SEG NEUTROPHILS: 74 % (ref 36–65)
SEGMENTED NEUTROPHILS ABSOLUTE COUNT: 8.98 K/UL (ref 1.5–8.1)
SODIUM BLD-SCNC: 137 MMOL/L (ref 135–144)
WBC # BLD: 12.3 K/UL (ref 3.5–11.3)

## 2022-09-09 PROCEDURE — 96375 TX/PRO/DX INJ NEW DRUG ADDON: CPT

## 2022-09-09 PROCEDURE — 85025 COMPLETE CBC W/AUTO DIFF WBC: CPT

## 2022-09-09 PROCEDURE — 70450 CT HEAD/BRAIN W/O DYE: CPT

## 2022-09-09 PROCEDURE — 6360000002 HC RX W HCPCS: Performed by: STUDENT IN AN ORGANIZED HEALTH CARE EDUCATION/TRAINING PROGRAM

## 2022-09-09 PROCEDURE — 96374 THER/PROPH/DIAG INJ IV PUSH: CPT

## 2022-09-09 PROCEDURE — 6360000002 HC RX W HCPCS: Performed by: EMERGENCY MEDICINE

## 2022-09-09 PROCEDURE — 99284 EMERGENCY DEPT VISIT MOD MDM: CPT

## 2022-09-09 PROCEDURE — 80048 BASIC METABOLIC PNL TOTAL CA: CPT

## 2022-09-09 RX ORDER — METOCLOPRAMIDE HYDROCHLORIDE 5 MG/ML
10 INJECTION INTRAMUSCULAR; INTRAVENOUS ONCE
Status: COMPLETED | OUTPATIENT
Start: 2022-09-09 | End: 2022-09-09

## 2022-09-09 RX ORDER — DEXAMETHASONE SODIUM PHOSPHATE 10 MG/ML
10 INJECTION, SOLUTION INTRAMUSCULAR; INTRAVENOUS ONCE
Status: COMPLETED | OUTPATIENT
Start: 2022-09-09 | End: 2022-09-09

## 2022-09-09 RX ORDER — BUTALBITAL, ACETAMINOPHEN AND CAFFEINE 50; 325; 40 MG/1; MG/1; MG/1
1 TABLET ORAL EVERY 4 HOURS PRN
Qty: 20 TABLET | Refills: 0 | Status: SHIPPED | OUTPATIENT
Start: 2022-09-09

## 2022-09-09 RX ORDER — KETOROLAC TROMETHAMINE 15 MG/ML
15 INJECTION, SOLUTION INTRAMUSCULAR; INTRAVENOUS ONCE
Status: COMPLETED | OUTPATIENT
Start: 2022-09-09 | End: 2022-09-09

## 2022-09-09 RX ORDER — DIPHENHYDRAMINE HYDROCHLORIDE 50 MG/ML
25 INJECTION INTRAMUSCULAR; INTRAVENOUS ONCE
Status: COMPLETED | OUTPATIENT
Start: 2022-09-09 | End: 2022-09-09

## 2022-09-09 RX ORDER — MORPHINE SULFATE 4 MG/ML
4 INJECTION, SOLUTION INTRAMUSCULAR; INTRAVENOUS ONCE
Status: COMPLETED | OUTPATIENT
Start: 2022-09-09 | End: 2022-09-09

## 2022-09-09 RX ORDER — ONDANSETRON 2 MG/ML
4 INJECTION INTRAMUSCULAR; INTRAVENOUS ONCE
Status: COMPLETED | OUTPATIENT
Start: 2022-09-09 | End: 2022-09-09

## 2022-09-09 RX ADMIN — MORPHINE SULFATE 4 MG: 4 INJECTION, SOLUTION INTRAMUSCULAR; INTRAVENOUS at 19:31

## 2022-09-09 RX ADMIN — DEXAMETHASONE SODIUM PHOSPHATE 10 MG: 10 INJECTION, SOLUTION INTRAMUSCULAR; INTRAVENOUS at 22:06

## 2022-09-09 RX ADMIN — ONDANSETRON 4 MG: 2 INJECTION INTRAMUSCULAR; INTRAVENOUS at 19:31

## 2022-09-09 RX ADMIN — DIPHENHYDRAMINE HYDROCHLORIDE 25 MG: 50 INJECTION INTRAMUSCULAR; INTRAVENOUS at 22:06

## 2022-09-09 RX ADMIN — KETOROLAC TROMETHAMINE 15 MG: 15 INJECTION, SOLUTION INTRAMUSCULAR; INTRAVENOUS at 22:06

## 2022-09-09 RX ADMIN — METOCLOPRAMIDE 10 MG: 5 INJECTION, SOLUTION INTRAMUSCULAR; INTRAVENOUS at 22:06

## 2022-09-09 ASSESSMENT — ENCOUNTER SYMPTOMS
NAUSEA: 1
EYE REDNESS: 0
VOMITING: 1
SHORTNESS OF BREATH: 0
ABDOMINAL PAIN: 0
DIARRHEA: 0
EYE DISCHARGE: 0
FACIAL SWELLING: 0
COLOR CHANGE: 0
CONSTIPATION: 0
COUGH: 0

## 2022-09-09 ASSESSMENT — PAIN SCALES - GENERAL: PAINLEVEL_OUTOF10: 10

## 2022-09-09 ASSESSMENT — PAIN - FUNCTIONAL ASSESSMENT: PAIN_FUNCTIONAL_ASSESSMENT: 0-10

## 2022-09-09 NOTE — ED PROVIDER NOTES
[address] [place] [address] ED  EMERGENCY DEPARTMENT ENCOUNTER      Pt Name: Cachorro Kumar  MRN: [id number]  [place] 1985  Date of evaluation: 9/9/2022  Provider: Vanessa Sparks MD    CHIEF COMPLAINT       Chief Complaint   Patient presents with    Migraine         HISTORY OF PRESENT ILLNESS  (Location/Symptom, Timing/Onset, Context/Setting, Quality, Duration, Modifying Factors, Severity.)   Cachorro Kumar is a 42 y.o. female who presents to the emergency department for headache and nausea and vomiting. This began 4 days ago and it involves her whole head. She states the last time she had a headache like this was when she had an aneurysm that ruptured. That occurred about 6 years ago. No trauma fever or stiff neck. No diarrhea cough or shortness of breath. She states that earlier today at the urgent care she had a COVID test and flu test and they were negative. Nursing Notes were reviewed.     ALLERGIES     Cymbalta [duloxetine hcl], Duloxetine, Percocet [oxycodone-acetaminophen], and Benzonatate    CURRENT MEDICATIONS       Discharge Medication List as of 9/9/2022 10:31 PM        CONTINUE these medications which have NOT CHANGED    Details   dicyclomine (BENTYL) 10 MG capsule Take 1 capsule by mouth every 6 hours as needed (cramps), Disp-20 capsule, R-0Normal      ondansetron (ZOFRAN ODT) 4 MG disintegrating tablet Take 1 tablet by mouth every 6 hours as needed for Nausea or Vomiting, Disp-20 tablet, R-0Normal      Erenumab-aooe (AIMOVIG) 70 MG/ML SOAJ Inject into the skinHistorical Med      benazepril (LOTENSIN) 10 MG tablet Take 10 mg by mouth dailyHistorical Med      tiZANidine (ZANAFLEX) 4 MG tablet Take 4 mg by mouth every 6 hours as neededHistorical Med      magnesium oxide (MAG-OX) 400 (241.3 Mg) MG TABS tablet TAKE ONE TABLET BY MOUTH ONCE DAILY, Disp-90 tablet, R-1Normal      aspirin 325 MG tablet Take 325 mg by mouth dailyHistorical Med      cyclobenzaprine (FLEXERIL) 5 MG tablet Take 5 mg by mouth 3 times daily as needed for Muscle spasmsHistorical Med      topiramate (TOPAMAX) 100 MG tablet Take 100 mg by mouth 2 times dailyHistorical Med      amitriptyline (ELAVIL) 25 MG tablet Take 1 tab (25 mg) at bedtime for 1 week, then 2 tabs (50 mg) for 1 week, then 3 tabs (75 mg) for 1 week, then 4 tabs (100 mg) thereafter, Disp-120 tablet, R-1Normal      famotidine (PEPCID) 20 MG tablet Take 1 tablet by mouth 2 times daily, Disp-180 tablet, R-1Normal      FLUoxetine (PROZAC) 40 MG capsule Take 40 mg by mouth daily Historical Med      cetirizine (ZYRTEC) 10 MG tablet Take 10 mg by mouth daily Historical Med      Docusate Sodium 100 MG TABS Take by mouth 2 times daily      acetaminophen (ACETAMINOPHEN 8 HOUR) 650 MG extended release tablet Take by mouth      fluticasone (FLONASE) 50 MCG/ACT nasal spray 1 spray by Nasal route 2 times daily, Disp-1 Bottle, R-0      albuterol sulfate  (90 BASE) MCG/ACT inhaler Inhale 2 puffs into the lungs every 4 hours as needed for Wheezing Ventolin HFA, Disp-1 Inhaler, R-1             PAST MEDICAL HISTORY         Diagnosis Date    Anxiety     Asthma     Depression     GERD (gastroesophageal reflux disease)     Hyperlipidemia     Hypertension     Migraines     Moyamoya     Ovarian cyst     PCOS (polycystic ovarian syndrome)     Raynaud disease     Stroke (HCC)     x4    Subarachnoid hemorrhage ([facility] [address]        SURGICAL HISTORY           Procedure Laterality Date    BRAIN ANEURYSM SURGERY  06/2016    OP REPORT ATTACHED TO MRI, [employer], MRI CONDITIONAL 3T OK. CAROTID-SUBCLAVIAN BYPASS GRAFT      right side     CHOLECYSTECTOMY      CYST REMOVAL Right 2015    ovarian right side     DENTAL SURGERY      all top teeth    DILATION AND CURETTAGE OF UTERUS      novasure ablation    INTRACRANIAL ARTERY ANGIOPLASTY      temporal artery removed    OTHER SURGICAL HISTORY  11/22/2017    cerebral angio    OTHER SURGICAL HISTORY  2003    PT HAS UNKNOWN CLIP IN NECK, ONLY 1.5 T.  PER  [facility]    TUBAL LIGATION           FAMILY HISTORY           Problem Relation Age of Onset    Diabetes Father     Stroke Father     Heart Attack Father     Alcohol Abuse Father     High Blood Pressure Father     Other Father         pre caner lumps in testicules     Heart Attack Maternal Grandmother     Other Paternal Grandfather         aneurysm     Cancer Maternal Aunt         lung     Family Status   Relation Name Status    Father  Alive    Mother  Alive    MGM  (Not Specified)    PGF  (Not Specified)    MAunt  (Not Specified)        SOCIAL HISTORY      reports that she quit smoking about 20 years ago. Her smoking use included cigarettes. She has never used smokeless tobacco. She reports current drug use. Drug: Marijuana Brandi Postin). She reports that she does not drink alcohol. REVIEW OF SYSTEMS    (2-9 systems for level 4, 10 or more for level 5)     Review of Systems   Constitutional:  Negative for chills, fatigue and fever. HENT:  Negative for congestion, ear discharge and facial swelling. Eyes:  Negative for discharge and redness. Respiratory:  Negative for cough and shortness of breath. Cardiovascular:  Negative for chest pain. Gastrointestinal:  Positive for nausea and vomiting. Negative for abdominal pain, constipation and diarrhea. Genitourinary:  Negative for dysuria and hematuria. Musculoskeletal:  Negative for arthralgias. Skin:  Negative for color change and rash. Neurological:  Positive for headaches. Negative for syncope and numbness. Hematological:  Negative for adenopathy. Psychiatric/Behavioral:  Negative for confusion. The patient is not nervous/anxious. Except as noted above the remainder of the review of systems was reviewed and negative.      PHYSICAL EXAM    (up to 7 for level 4, 8 or more for level 5)     Vitals:    09/09/22 1720   BP: (!) 165/102   Pulse: (!) 105   Resp: 14   Temp: 98.9 °F (37.2 °C)   TempSrc: Oral   SpO2: 99%   Weight: 138 lb (62.6 kg) METABOLIC PANEL - Abnormal; Notable for the following components:    Glucose 124 (*)     Creatinine 1.15 (*)     CO2 14 (*)     Anion Gap 19 (*)     GFR Non- 53 (*)     All other components within normal limits       All other labs were within normal range or not returned as of this dictation. EMERGENCY DEPARTMENT COURSE and DIFFERENTIAL DIAGNOSIS/MDM:   Vitals:    Vitals:    09/09/22 1720   BP: (!) 165/102   Pulse: (!) 105   Resp: 14   Temp: 98.9 °F (37.2 °C)   TempSrc: Oral   SpO2: 99%   Weight: 138 lb (62.6 kg)   Height: 4' 10\" (1.473 m)       Orders Placed This Encounter   Medications    ondansetron (ZOFRAN) injection 4 mg    morphine sulfate (PF) injection 4 mg    ketorolac (TORADOL) injection 15 mg    diphenhydrAMINE (BENADRYL) injection 25 mg    metoclopramide (REGLAN) injection 10 mg    dexamethasone (PF) (DECADRON) injection 10 mg    butalbital-acetaminophen-caffeine (FIORICET, ESGIC) -40 MG per tablet     Sig: Take 1 tablet by mouth every 4 hours as needed for Headaches     Dispense:  20 tablet     Refill:  0       Medical Decision Making: CT scan ordered and the patient is being signed out to subsequent physician. CONSULTS:  None    PROCEDURES:  None    FINAL IMPRESSION      1. Nonintractable headache, unspecified chronicity pattern, unspecified headache type          DISPOSITION/PLAN   DISPOSITION Decision To Discharge 09/09/2022 10:28:28 PM      PATIENT REFERRED TO:   Araceli Mabry MD  [address]  [phone]    Schedule an appointment as soon as possible for a visit in 1 week  As needed    DISCHARGE MEDICATIONS:     Discharge Medication List as of 9/9/2022 10:31 PM          The care is provided during an unprecedented national emergency due to the novel coronavirus, COVID-19.     (Please note that portions of this note were completed with a voice recognition program.  Efforts were made to edit the dictations but occasionally words are mis-transcribed.)    Bruce Ruffin MD  Attending Emergency Physician            Bruce Ruffin MD  09/15/22 5779

## 2022-09-10 NOTE — ED NOTES
Pt presenitng to the ED with a migraine that has been going on since Tuesday. Pt states that she gets migraines frequently, but they only last around 24 hours. Pt states that she has also been vomiting since the migraine started. Pt rates pain of 10/10. Pt is A&Ox4.       Hattie Patel RN  09/09/22 2001

## 2022-09-11 NOTE — ED PROVIDER NOTES
[address] [place] [address] ED  Emergency Department  Emergency Medicine     Care of Viviana Love was assumed from previous provider and is being seen for Migraine  . The patient's initial evaluation and plan have been discussed with the prior provider who initially evaluated the patient. EMERGENCY DEPARTMENT COURSE / MEDICAL DECISION MAKING:       MEDICATIONS GIVEN:  Orders Placed This Encounter   Medications    ondansetron (ZOFRAN) injection 4 mg    morphine sulfate (PF) injection 4 mg    ketorolac (TORADOL) injection 15 mg    diphenhydrAMINE (BENADRYL) injection 25 mg    metoclopramide (REGLAN) injection 10 mg    dexamethasone (PF) (DECADRON) injection 10 mg    butalbital-acetaminophen-caffeine (FIORICET, ESGIC) -40 MG per tablet     Sig: Take 1 tablet by mouth every 4 hours as needed for Headaches     Dispense:  20 tablet     Refill:  0       LABS / RADIOLOGY:     Labs Reviewed   CBC WITH AUTO DIFFERENTIAL - Abnormal; Notable for the following components:       Result Value    WBC 12.3 (*)     Hemoglobin 15.9 (*)     Hematocrit 49.8 (*)     Seg Neutrophils 74 (*)     Lymphocytes 19 (*)     Eosinophils % 0 (*)     Immature Granulocytes 1 (*)     Segs Absolute 8.98 (*)     All other components within normal limits   BASIC METABOLIC PANEL - Abnormal; Notable for the following components:    Glucose 124 (*)     Creatinine 1.15 (*)     CO2 14 (*)     Anion Gap 19 (*)     GFR Non- 53 (*)     All other components within normal limits       CT HEAD WO CONTRAST    Result Date: 9/9/2022  EXAMINATION: CT OF THE HEAD WITHOUT CONTRAST  9/9/2022 8:23 pm TECHNIQUE: CT of the head was performed without the administration of intravenous contrast. Automated exposure control, iterative reconstruction, and/or weight based adjustment of the mA/kV was utilized to reduce the radiation dose to as low as reasonably achievable.  COMPARISON: MRI 06/02/2022 HISTORY: ORDERING SYSTEM PROVIDED HISTORY: Headache, had aneurysm with coil 6 years ago TECHNOLOGIST PROVIDED HISTORY: Headache, had aneurysm with coil 6 years ago Decision Support Exception - unselect if not a suspected or confirmed emergency medical condition->Emergency Medical Condition (MA) Is the patient pregnant?->No Reason for Exam: Headache x4 days, h/o stroke x4, aneurysm with coil, light sensitivity FINDINGS: BRAIN/VENTRICLES: Stable multifocal encephalomalacia most evident in the right frontal lobe. No acute hemorrhage or evidence of acute infarct. No hydrocephalus or extra-axial fluid collection. Aneurysm coil pack artifact in the region of the basilar tip. Dystrophic parenchymal calcifications subjacent to a right frontal craniotomy site are unchanged. Midline is maintained. The basal cisterns are patent. ORBITS: The visualized portion of the orbits demonstrate no acute abnormality. SINUSES: The visualized paranasal sinuses and mastoid air cells demonstrate no acute abnormality. SOFT TISSUES/SKULL:  Prior right anterior craniotomy. No acute osseous or soft tissue abnormality. 1. No acute intracranial abnormality. 2. Stable multifocal encephalomalacia. 3. Aneurysm coil pack in the region of the basilar tip. RECENT VITALS:     Temp: 98.9 °F (37.2 °C),  Heart Rate: (!) 105, Resp: 14, BP: (!) 165/102, SpO2: 99 %         This patient is a 42 y.o. Female with worsening headache. History of subarachnoid with aneurysm coiling. CT imaging obtained negative. Some pain relief with morphine. Given Reglan, Benadryl, Decadron, Toradol with good effect. Follow-up with neurologist.       FINAL IMPRESSION:     1.  Nonintractable headache, unspecified chronicity pattern, unspecified headache type        DISPOSITION:         DISPOSITION:  [x]  Discharge   []  Transfer -    []  Admission -     []  Against Medical Advice   []  Eloped   FOLLOW-UP: Roxie Benavidez, [address] [facility]  [phone]    Schedule an appointment as soon as possible for a visit in 1 week  As needed     DISCHARGE MEDICATIONS: Discharge Medication List as of 9/9/2022 10:31 PM             Lottie Perry DO  Emergency Medicine       Lottie Perry DO  09/10/22 2030

## 2022-11-25 ENCOUNTER — APPOINTMENT (OUTPATIENT)
Dept: CT IMAGING | Age: 37
End: 2022-11-25
Payer: MEDICARE

## 2022-11-25 ENCOUNTER — APPOINTMENT (OUTPATIENT)
Dept: GENERAL RADIOLOGY | Age: 37
End: 2022-11-25
Payer: MEDICARE

## 2022-11-25 ENCOUNTER — HOSPITAL ENCOUNTER (EMERGENCY)
Age: 37
Discharge: ANOTHER HOSPITAL | End: 2022-11-25
Attending: EMERGENCY MEDICINE
Payer: MEDICARE

## 2022-11-25 ENCOUNTER — HOSPITAL ENCOUNTER (EMERGENCY)
Age: 37
Discharge: HOME | End: 2022-11-26
Attending: EMERGENCY MEDICINE
Payer: MEDICARE

## 2022-11-25 ENCOUNTER — APPOINTMENT (OUTPATIENT)
Dept: ULTRASOUND IMAGING | Age: 37
End: 2022-11-25
Payer: MEDICARE

## 2022-11-25 VITALS
BODY MASS INDEX: 29.39 KG/M2 | WEIGHT: 140 LBS | HEIGHT: 58 IN | OXYGEN SATURATION: 97 % | RESPIRATION RATE: 24 BRPM | SYSTOLIC BLOOD PRESSURE: 140 MMHG | DIASTOLIC BLOOD PRESSURE: 88 MMHG | HEART RATE: 93 BPM | TEMPERATURE: 97.9 F

## 2022-11-25 DIAGNOSIS — R51.9 NONINTRACTABLE HEADACHE, UNSPECIFIED CHRONICITY PATTERN, UNSPECIFIED HEADACHE TYPE: ICD-10-CM

## 2022-11-25 DIAGNOSIS — R11.2 NAUSEA AND VOMITING, UNSPECIFIED VOMITING TYPE: Primary | ICD-10-CM

## 2022-11-25 DIAGNOSIS — N83.201 CYSTS OF BOTH OVARIES: ICD-10-CM

## 2022-11-25 DIAGNOSIS — R10.84 GENERALIZED ABDOMINAL PAIN: ICD-10-CM

## 2022-11-25 DIAGNOSIS — N83.202 CYSTS OF BOTH OVARIES: ICD-10-CM

## 2022-11-25 DIAGNOSIS — N30.00 ACUTE CYSTITIS WITHOUT HEMATURIA: ICD-10-CM

## 2022-11-25 DIAGNOSIS — I67.5 MOYA MOYA DISEASE: ICD-10-CM

## 2022-11-25 LAB
ABSOLUTE EOS #: <0.03 K/UL (ref 0–0.44)
ABSOLUTE IMMATURE GRANULOCYTE: 0.05 K/UL (ref 0–0.3)
ABSOLUTE LYMPH #: 1.59 K/UL (ref 1.1–3.7)
ABSOLUTE MONO #: 0.56 K/UL (ref 0.1–1.2)
ALBUMIN SERPL-MCNC: 4.9 G/DL (ref 3.5–5.2)
ALP BLD-CCNC: 137 U/L (ref 35–104)
ALT SERPL-CCNC: 42 U/L (ref 5–33)
ANION GAP SERPL CALCULATED.3IONS-SCNC: 15 MMOL/L (ref 9–17)
AST SERPL-CCNC: 31 U/L
BACTERIA: ABNORMAL
BASOPHILS # BLD: 0 % (ref 0–2)
BASOPHILS ABSOLUTE: 0.03 K/UL (ref 0–0.2)
BILIRUB SERPL-MCNC: 0.6 MG/DL (ref 0.3–1.2)
BILIRUBIN DIRECT: 0.1 MG/DL
BILIRUBIN URINE: NEGATIVE
BILIRUBIN, INDIRECT: 0.5 MG/DL (ref 0–1)
BUN BLDV-MCNC: 20 MG/DL (ref 6–20)
BUN/CREAT BLD: 17 (ref 9–20)
CALCIUM SERPL-MCNC: 9.8 MG/DL (ref 8.6–10.4)
CHLORIDE BLD-SCNC: 102 MMOL/L (ref 98–107)
CO2: 20 MMOL/L (ref 20–31)
COLOR: YELLOW
CREAT SERPL-MCNC: 1.15 MG/DL (ref 0.5–0.9)
EOSINOPHILS RELATIVE PERCENT: 0 % (ref 1–4)
EPITHELIAL CELLS UA: ABNORMAL /HPF (ref 0–5)
GFR SERPL CREATININE-BSD FRML MDRD: >60 ML/MIN/1.73M2
GLUCOSE BLD-MCNC: 101 MG/DL (ref 70–99)
GLUCOSE URINE: NEGATIVE
HCG QUANTITATIVE: <1 MIU/ML
HCT VFR BLD CALC: 48 % (ref 36.3–47.1)
HEMOGLOBIN: 15.7 G/DL (ref 11.9–15.1)
IMMATURE GRANULOCYTES: 0 %
INR BLD: 1.1
KETONES, URINE: ABNORMAL
LEUKOCYTE ESTERASE, URINE: ABNORMAL
LIPASE: 57 U/L (ref 13–60)
LYMPHOCYTES # BLD: 14 % (ref 24–43)
MCH RBC QN AUTO: 31.7 PG (ref 25.2–33.5)
MCHC RBC AUTO-ENTMCNC: 32.7 G/DL (ref 28.4–34.8)
MCV RBC AUTO: 97 FL (ref 82.6–102.9)
MONOCYTES # BLD: 5 % (ref 3–12)
NITRITE, URINE: POSITIVE
NRBC AUTOMATED: 0 PER 100 WBC
PDW BLD-RTO: 13 % (ref 11.8–14.4)
PH UA: 6 (ref 5–8)
PLATELET # BLD: 341 K/UL (ref 138–453)
PMV BLD AUTO: 9.1 FL (ref 8.1–13.5)
POTASSIUM SERPL-SCNC: 4.3 MMOL/L (ref 3.7–5.3)
PROTEIN UA: NEGATIVE
PROTHROMBIN TIME: 13.8 SEC (ref 11.5–14.2)
RBC # BLD: 4.95 M/UL (ref 3.95–5.11)
RBC UA: ABNORMAL /HPF (ref 0–2)
SEG NEUTROPHILS: 81 % (ref 36–65)
SEGMENTED NEUTROPHILS ABSOLUTE COUNT: 9.24 K/UL (ref 1.5–8.1)
SODIUM BLD-SCNC: 137 MMOL/L (ref 135–144)
SPECIFIC GRAVITY UA: 1.02 (ref 1–1.03)
TOTAL PROTEIN: 8 G/DL (ref 6.4–8.3)
TURBIDITY: ABNORMAL
URINE HGB: NEGATIVE
UROBILINOGEN, URINE: NORMAL
WBC # BLD: 11.5 K/UL (ref 3.5–11.3)
WBC UA: ABNORMAL /HPF (ref 0–5)

## 2022-11-25 PROCEDURE — 76830 TRANSVAGINAL US NON-OB: CPT

## 2022-11-25 PROCEDURE — 76856 US EXAM PELVIC COMPLETE: CPT

## 2022-11-25 PROCEDURE — 70498 CT ANGIOGRAPHY NECK: CPT

## 2022-11-25 PROCEDURE — 80048 BASIC METABOLIC PNL TOTAL CA: CPT

## 2022-11-25 PROCEDURE — 99285 EMERGENCY DEPT VISIT HI MDM: CPT

## 2022-11-25 PROCEDURE — 74177 CT ABD & PELVIS W/CONTRAST: CPT

## 2022-11-25 PROCEDURE — 87088 URINE BACTERIA CULTURE: CPT

## 2022-11-25 PROCEDURE — 85610 PROTHROMBIN TIME: CPT

## 2022-11-25 PROCEDURE — 6370000000 HC RX 637 (ALT 250 FOR IP): Performed by: EMERGENCY MEDICINE

## 2022-11-25 PROCEDURE — 96361 HYDRATE IV INFUSION ADD-ON: CPT

## 2022-11-25 PROCEDURE — 80076 HEPATIC FUNCTION PANEL: CPT

## 2022-11-25 PROCEDURE — 84702 CHORIONIC GONADOTROPIN TEST: CPT

## 2022-11-25 PROCEDURE — 2580000003 HC RX 258

## 2022-11-25 PROCEDURE — 83690 ASSAY OF LIPASE: CPT

## 2022-11-25 PROCEDURE — 93005 ELECTROCARDIOGRAM TRACING: CPT | Performed by: EMERGENCY MEDICINE

## 2022-11-25 PROCEDURE — 96374 THER/PROPH/DIAG INJ IV PUSH: CPT

## 2022-11-25 PROCEDURE — 96365 THER/PROPH/DIAG IV INF INIT: CPT

## 2022-11-25 PROCEDURE — 93976 VASCULAR STUDY: CPT

## 2022-11-25 PROCEDURE — 71045 X-RAY EXAM CHEST 1 VIEW: CPT

## 2022-11-25 PROCEDURE — 96375 TX/PRO/DX INJ NEW DRUG ADDON: CPT

## 2022-11-25 PROCEDURE — 6360000002 HC RX W HCPCS

## 2022-11-25 PROCEDURE — 6360000004 HC RX CONTRAST MEDICATION: Performed by: EMERGENCY MEDICINE

## 2022-11-25 PROCEDURE — 87186 SC STD MICRODIL/AGAR DIL: CPT

## 2022-11-25 PROCEDURE — 99284 EMERGENCY DEPT VISIT MOD MDM: CPT

## 2022-11-25 PROCEDURE — 81001 URINALYSIS AUTO W/SCOPE: CPT

## 2022-11-25 PROCEDURE — 85025 COMPLETE CBC W/AUTO DIFF WBC: CPT

## 2022-11-25 PROCEDURE — 87086 URINE CULTURE/COLONY COUNT: CPT

## 2022-11-25 PROCEDURE — 2580000003 HC RX 258: Performed by: EMERGENCY MEDICINE

## 2022-11-25 PROCEDURE — 6360000002 HC RX W HCPCS: Performed by: EMERGENCY MEDICINE

## 2022-11-25 RX ORDER — 0.9 % SODIUM CHLORIDE 0.9 %
80 INTRAVENOUS SOLUTION INTRAVENOUS ONCE
Status: COMPLETED | OUTPATIENT
Start: 2022-11-25 | End: 2022-11-25

## 2022-11-25 RX ORDER — FENTANYL CITRATE 0.05 MG/ML
25 INJECTION, SOLUTION INTRAMUSCULAR; INTRAVENOUS ONCE
Status: COMPLETED | OUTPATIENT
Start: 2022-11-25 | End: 2022-11-25

## 2022-11-25 RX ORDER — ONDANSETRON 2 MG/ML
4 INJECTION INTRAMUSCULAR; INTRAVENOUS ONCE
Status: COMPLETED | OUTPATIENT
Start: 2022-11-25 | End: 2022-11-25

## 2022-11-25 RX ORDER — ACETAMINOPHEN 325 MG/1
650 TABLET ORAL ONCE
Status: COMPLETED | OUTPATIENT
Start: 2022-11-25 | End: 2022-11-25

## 2022-11-25 RX ORDER — SODIUM CHLORIDE 0.9 % (FLUSH) 0.9 %
10 SYRINGE (ML) INJECTION ONCE
Status: COMPLETED | OUTPATIENT
Start: 2022-11-25 | End: 2022-11-25

## 2022-11-25 RX ORDER — ONDANSETRON 2 MG/ML
4 INJECTION INTRAMUSCULAR; INTRAVENOUS EVERY 4 HOURS PRN
Status: DISCONTINUED | OUTPATIENT
Start: 2022-11-25 | End: 2022-11-26 | Stop reason: HOSPADM

## 2022-11-25 RX ORDER — 0.9 % SODIUM CHLORIDE 0.9 %
1000 INTRAVENOUS SOLUTION INTRAVENOUS ONCE
Status: COMPLETED | OUTPATIENT
Start: 2022-11-25 | End: 2022-11-25

## 2022-11-25 RX ORDER — 0.9 % SODIUM CHLORIDE 0.9 %
1000 INTRAVENOUS SOLUTION INTRAVENOUS ONCE
Status: COMPLETED | OUTPATIENT
Start: 2022-11-25 | End: 2022-11-26

## 2022-11-25 RX ORDER — KETOROLAC TROMETHAMINE 30 MG/ML
30 INJECTION, SOLUTION INTRAMUSCULAR; INTRAVENOUS ONCE
Status: COMPLETED | OUTPATIENT
Start: 2022-11-25 | End: 2022-11-25

## 2022-11-25 RX ADMIN — FENTANYL CITRATE 25 MCG: 0.05 INJECTION, SOLUTION INTRAMUSCULAR; INTRAVENOUS at 14:30

## 2022-11-25 RX ADMIN — ONDANSETRON 4 MG: 2 INJECTION INTRAMUSCULAR; INTRAVENOUS at 23:08

## 2022-11-25 RX ADMIN — SODIUM CHLORIDE 1000 ML: 9 INJECTION, SOLUTION INTRAVENOUS at 23:10

## 2022-11-25 RX ADMIN — SODIUM CHLORIDE, PRESERVATIVE FREE 10 ML: 5 INJECTION INTRAVENOUS at 12:51

## 2022-11-25 RX ADMIN — ACETAMINOPHEN 650 MG: 325 TABLET, FILM COATED ORAL at 16:57

## 2022-11-25 RX ADMIN — CEFTRIAXONE SODIUM 1000 MG: 1 INJECTION, POWDER, FOR SOLUTION INTRAMUSCULAR; INTRAVENOUS at 17:16

## 2022-11-25 RX ADMIN — KETOROLAC TROMETHAMINE 30 MG: 30 INJECTION, SOLUTION INTRAMUSCULAR; INTRAVENOUS at 23:07

## 2022-11-25 RX ADMIN — SODIUM CHLORIDE 80 ML: 9 INJECTION, SOLUTION INTRAVENOUS at 12:51

## 2022-11-25 RX ADMIN — IOPAMIDOL 75 ML: 755 INJECTION, SOLUTION INTRAVENOUS at 12:51

## 2022-11-25 RX ADMIN — ONDANSETRON 4 MG: 2 INJECTION INTRAMUSCULAR; INTRAVENOUS at 11:40

## 2022-11-25 RX ADMIN — SODIUM CHLORIDE 1000 ML: 9 INJECTION, SOLUTION INTRAVENOUS at 11:38

## 2022-11-25 ASSESSMENT — ENCOUNTER SYMPTOMS
SHORTNESS OF BREATH: 0
PHOTOPHOBIA: 0
COLOR CHANGE: 0
CHEST TIGHTNESS: 0
NAUSEA: 1
ABDOMINAL PAIN: 1
BACK PAIN: 0
FACIAL SWELLING: 0
EYE PAIN: 0
VOICE CHANGE: 0
TROUBLE SWALLOWING: 0
VOMITING: 1

## 2022-11-25 ASSESSMENT — PAIN DESCRIPTION - LOCATION
LOCATION: HEAD
LOCATION: HEAD;ABDOMEN

## 2022-11-25 ASSESSMENT — PAIN SCALES - GENERAL
PAINLEVEL_OUTOF10: 8
PAINLEVEL_OUTOF10: 10
PAINLEVEL_OUTOF10: 8

## 2022-11-25 ASSESSMENT — PAIN - FUNCTIONAL ASSESSMENT
PAIN_FUNCTIONAL_ASSESSMENT: 0-10
PAIN_FUNCTIONAL_ASSESSMENT: 0-10

## 2022-11-25 NOTE — ED NOTES
ED to inpatient nurses report     Chief Complaint   Patient presents with    Abdominal Pain    Emesis     X3 days      Present to ED from home via POV  LOC: alert and orientated to name, place, date  Vital signs   Vitals:    11/25/22 0944 11/25/22 0946 11/25/22 1708   BP: (!) 138/110  (!) 141/85   Pulse: (!) 107  100   Resp: 18  14   Temp: 97.9 °F (36.6 °C)     TempSrc: Temporal     SpO2: 97%  97%   Weight:  140 lb (63.5 kg)    Height:  4' 10\" (1.473 m)       Oxygen Baseline None    Current needs required None   SEPSIS: NO  [NO] Lactate X 2 ordered (Yes or No)  [NO] Antibiotics given (Yes or No)  [NO] IV Fluids ordered (Yes or No)             [YES] IV completed (Yes or No)  [NO] Hourly Vital Signs (Validated)  [NO] Outstanding Orders:     LDAs:   Peripheral IV 11/25/22 Right Antecubital (Active)   Site Assessment Clean, dry & intact 11/25/22 1136   Line Status Blood return noted; Flushed; Infusing;Specimen collected 11/25/22 1136   Dressing Status New dressing applied;Clean, dry & intact 11/25/22 1136   Dressing Type Transparent 11/25/22 1136     Mobility: Independent  Fall Risk:    Pending ED orders: None  Present condition: Stable  Code Status: Full  Consults: IP CONSULT TO NEUROLOGY  IP CONSULT TO INTERNAL MEDICINE  [x]  Hospitalist  Completed  [x] yes [] no Who: Intermed  []  Medicine  Completed  [] yes [] No Who:   []  Cardiology  Completed  [] yes [] No Who:   []  GI   Completed  [] yes [] No Who:   [x]  Neurology  Completed  [x] yes [] No Who: Mode  []  Nephrology Completed  [] yes [] No Who:    []  Vascular  Completed  [] yes [] No Who:   []  Ortho  Completed  [] yes [] No Who:     []  Surgery  Completed  [] yes [] No Who:    []  Urology  Completed  [] yes [] No Who:    []  CT Surgery Completed  [] yes [] No Who:   []  Podiatry  Completed  [] yes [] No Who:    []  Other    Completed  [] yes [] No Who:  Interventions: 12 lead EKG, CXR, CT head and neck/chest and abdomen with contrast, US of abdomen/pelvis, UA/UC, blood labs as resulted. Important Events: 75-year-old female presenting to the ER complaining of nausea vomiting since Tuesday. Patient is also complaining of abdominal pain. Patient states she has a history of cholecystectomy. Patient is also complaining of a headache that is on the posterior side. Patient has a history of an aneurysm that turned into a bleed. Patient has a history of multiple strokes. Patient states since then she has had chronic headaches but this does not feel like her chronic headaches and feels like it is localized to the posterior section of her head at the intersection of the neck and typically she has generalized headache. The patient denies any focal deficits.        Electronically signed by Shahana Alvarez RN on 22/13/1458 at 5:19 PM       Shahana Alvarez RN  28/39/12 9601

## 2022-11-25 NOTE — ED NOTES
Updated patient and parents at bedside on POC for transfer to [facility] SPECIALTY [facility]. Dg Hairston, RN  81/27/00 2133

## 2022-11-25 NOTE — ED NOTES
Called report to ED charge at [facility]. Dg Moss., RN  12/01/65 9485

## 2022-11-25 NOTE — ED NOTES
[facility] ETA 2200 for transport to [facility]. 's ED, but will try and get here earlier.       Supriya Cornell, BRIT  26/99/18 3710

## 2022-11-25 NOTE — ED NOTES
Patient reports N/V/D for the past 3 days, cannot keep anything down and is not sleeping even with use of Unisom. Patient states she recently started a new job and doesn't know if illness is related to anxiety from new job or if she is sick. Patient denies being around anyone sick recently that she knows of and denies fever. Patient presents anxious, jittery, shaking and weak in voice.       Tam Nolan., RN  71/41/32 4325 [facility]., RN  14/63/99 4282

## 2022-11-25 NOTE — ED PROVIDER NOTES
EMERGENCY DEPARTMENT ENCOUNTER    Pt Name: Roly Salas  MRN: [id number]  [place] 1985  Date of evaluation: 11/25/22  CHIEF COMPLAINT       Chief Complaint   Patient presents with    Abdominal Pain    Emesis     X3 days     HISTORY OF PRESENT ILLNESS   68-year-old female presenting to the ER complaining of nausea vomiting since Tuesday. Patient is also complaining of abdominal pain. Patient states she has a history of cholecystectomy. Patient is also complaining of a headache that is on the posterior side. Patient has a history of an aneurysm that turned into a bleed. Patient has a history of multiple strokes. Patient states since then she has had chronic headaches but this does not feel like her chronic headaches and feels like it is localized to the posterior section of her head at the intersection of the neck and typically she has generalized headache. The patient denies any focal deficits. The history is provided by the patient. Abdominal Pain  Pain location:  Generalized  Pain quality: aching    Duration:  2 days  Timing:  Constant  Chronicity:  New  Associated symptoms: nausea and vomiting    Associated symptoms: no chest pain, no dysuria, no fatigue and no shortness of breath          REVIEW OF SYSTEMS     Review of Systems   Constitutional:  Negative for activity change, appetite change and fatigue. HENT:  Negative for facial swelling, trouble swallowing and voice change. Eyes:  Negative for photophobia and pain. Respiratory:  Negative for chest tightness and shortness of breath. Cardiovascular:  Negative for chest pain and palpitations. Gastrointestinal:  Positive for abdominal pain, nausea and vomiting. Genitourinary:  Negative for dysuria and urgency. Musculoskeletal:  Negative for arthralgias and back pain. Skin:  Negative for color change and rash. Neurological:  Positive for headaches. Negative for dizziness and syncope.    Psychiatric/Behavioral:  Negative for behavioral problems and hallucinations. PASTMEDICAL HISTORY     Past Medical History:   Diagnosis Date    Anxiety     Asthma     Depression     GERD (gastroesophageal reflux disease)     Hyperlipidemia     Hypertension     Migraines     Moyamoya     Ovarian cyst     PCOS (polycystic ovarian syndrome)     Raynaud disease     Stroke (HCC)     x4    Subarachnoid hemorrhage [facility])      Past Problem List  Patient Active Problem List   Diagnosis Code    Moyamoya I67.5    Depression F32. 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    Essential hypertension I10    Chronic migraine w/o aura w/o status migrainosus, not intractable G43.709    Mixed hyperlipidemia E78.2    Irritability and anger R45.4    Intractable cyclical vomiting with nausea R11.15    Subarachnoid hemorrhage (HCC) I60.9    Acne vulgaris L70.0    Suicidal ideation R45.851    Major depressive disorder, recurrent severe without psychotic features ([address] F33.2    Aneurysm ([facility] [address] I72.9     SURGICAL HISTORY       Past Surgical History:   Procedure Laterality Date    BRAIN ANEURYSM SURGERY  06/2016    OP REPORT ATTACHED TO MRI, [employer], MRI CONDITIONAL 3T OK. CAROTID-SUBCLAVIAN BYPASS GRAFT      right side     CHOLECYSTECTOMY      CYST REMOVAL Right 2015    ovarian right side     DENTAL SURGERY      all top teeth    DILATION AND CURETTAGE OF UTERUS      novasure ablation    INTRACRANIAL ARTERY ANGIOPLASTY      temporal artery removed    OTHER SURGICAL HISTORY  11/22/2017    cerebral angio    OTHER SURGICAL HISTORY  2003    PT HAS UNKNOWN CLIP IN NECK, ONLY 1.5 T.  PER [facility]    TUBAL LIGATION       CURRENT MEDICATIONS       Discharge Medication List as of 11/25/2022  9:17 PM        CONTINUE these medications which have NOT CHANGED    Details   butalbital-acetaminophen-caffeine (FIORICET, ESGIC) -40 MG per tablet Take 1 tablet by mouth every 4 hours as needed for Headaches, Disp-20 tablet, R-0Print      dicyclomine (BENTYL) 10 MG capsule Take 1 capsule by mouth every 6 hours as needed (cramps), Disp-20 capsule, R-0Normal      ondansetron (ZOFRAN ODT) 4 MG disintegrating tablet Take 1 tablet by mouth every 6 hours as needed for Nausea or Vomiting, Disp-20 tablet, R-0Normal      Erenumab-aooe (AIMOVIG) 70 MG/ML SOAJ Inject into the skinHistorical Med      benazepril (LOTENSIN) 10 MG tablet Take 10 mg by mouth dailyHistorical Med      tiZANidine (ZANAFLEX) 4 MG tablet Take 4 mg by mouth every 6 hours as neededHistorical Med      magnesium oxide (MAG-OX) 400 (241.3 Mg) MG TABS tablet TAKE ONE TABLET BY MOUTH ONCE DAILY, Disp-90 tablet, R-1Normal      aspirin 325 MG tablet Take 325 mg by mouth dailyHistorical Med      cyclobenzaprine (FLEXERIL) 5 MG tablet Take 5 mg by mouth 3 times daily as needed for Muscle spasmsHistorical Med      topiramate (TOPAMAX) 100 MG tablet Take 100 mg by mouth 2 times dailyHistorical Med      amitriptyline (ELAVIL) 25 MG tablet Take 1 tab (25 mg) at bedtime for 1 week, then 2 tabs (50 mg) for 1 week, then 3 tabs (75 mg) for 1 week, then 4 tabs (100 mg) thereafter, Disp-120 tablet, R-1Normal      famotidine (PEPCID) 20 MG tablet Take 1 tablet by mouth 2 times daily, Disp-180 tablet, R-1Normal      FLUoxetine (PROZAC) 40 MG capsule Take 40 mg by mouth daily Historical Med      cetirizine (ZYRTEC) 10 MG tablet Take 10 mg by mouth daily Historical Med      Docusate Sodium 100 MG TABS Take by mouth 2 times daily      acetaminophen (ACETAMINOPHEN 8 HOUR) 650 MG extended release tablet Take by mouth      fluticasone (FLONASE) 50 MCG/ACT nasal spray 1 spray by Nasal route 2 times daily, Disp-1 Bottle, R-0      albuterol sulfate  (90 BASE) MCG/ACT inhaler Inhale 2 puffs into the lungs every 4 hours as needed for Wheezing Ventolin HFA, Disp-1 Inhaler, R-1           ALLERGIES     is allergic to cymbalta [duloxetine hcl], duloxetine, percocet [oxycodone-acetaminophen], and benzonatate.   FAMILY HISTORY     She indicated that her mother is alive. She indicated that her father is alive. She indicated that the status of her maternal grandmother is unknown. She indicated that the status of her paternal grandfather is unknown. She indicated that the status of her maternal aunt is unknown. SOCIAL HISTORY       Social History     Tobacco Use    Smoking status: Former     Types: Cigarettes     Quit date: 2002     Years since quittin.9    Smokeless tobacco: Never    Tobacco comments:     caffeine: none   Vaping Use    Vaping Use: Never used   Substance Use Topics    Alcohol use: No     Alcohol/week: 0.0 standard drinks    Drug use: Yes     Types: Marijuana (Weed)     Comment: occassionally     PHYSICAL EXAM     INITIAL VITALS: BP (!) 140/88   Pulse 93   Temp 97.9 °F (36.6 °C) (Temporal)   Resp 24   Ht 4' 10\" (1.473 m)   Wt 140 lb (63.5 kg)   SpO2 97%   BMI 29.26 kg/m²    Physical Exam  Vitals reviewed. Constitutional:       General: She is not in acute distress. Appearance: Normal appearance. She is not ill-appearing or toxic-appearing. HENT:      Head: Normocephalic and atraumatic. Right Ear: External ear normal.      Left Ear: External ear normal.      Nose: No congestion or rhinorrhea. Eyes:      Extraocular Movements: Extraocular movements intact. Pupils: Pupils are equal, round, and reactive to light. Cardiovascular:      Rate and Rhythm: Regular rhythm. Tachycardia present. Pulses: Normal pulses. Heart sounds: Normal heart sounds. Pulmonary:      Effort: Pulmonary effort is normal. No respiratory distress. Breath sounds: Normal breath sounds. No wheezing. Abdominal:      General: Bowel sounds are normal. There is no distension. Palpations: Abdomen is soft. Tenderness: There is generalized abdominal tenderness. Musculoskeletal:         General: No deformity or signs of injury. Normal range of motion. Cervical back: No rigidity or tenderness.    Skin:     General: Skin is warm and dry. Neurological:      Mental Status: She is alert and oriented to person, place, and time. Mental status is at baseline. Psychiatric:         Mood and Affect: Mood normal.         Behavior: Behavior normal.       MEDICAL DECISION MAKING:   Patient with abdominal pain, nausea vomiting and a headache. Patient with an underlying history of moyamoya disease. The patient has a headache today that is unique as compared to her previous headaches. A CTA head and neck did not display any signs of acute abnormality. Neurology was consulted and so was endo neurology and they both agreed that the patient should be admitted for an assessment by neurology along with an MRI of the brain. The patient also had abdominal pain and a CT did display signs of ovarian cysts. An ultrasound image did not display signs of any torsion. The patient's pain eventually was well controlled in the ER. The admitting team at [facility] was more comfortable with the patient being transferred to [facility] due to the fact that they do have an available OB/GYN physician as well as endovascular neurologist.  The patient was transferred to [facility] ER after an accepting physician accepted the patient for transfer. The patient was transported via EMS ALS. Patient understands and agrees with the plan. The patient did display signs of a urinary tract infection was started on IV antibiotics in the ER. CRITICAL CARE:       PROCEDURES:    Procedures    DIAGNOSTIC RESULTS   EKG:All EKG's are interpreted by the Emergency Department Physician who either signs or Co-signs this chart in the absence of a cardiologist.        RADIOLOGY:All plain film, CT, MRI, and formal ultrasound images (except ED bedside ultrasound) are read by the radiologist, see reports below, unless otherwisenoted in MDM or here. US PELVIS COMPLETE   Preliminary Result   Multiple large anechoic cysts noted in both ovaries.   This may represent ovarian hyperstimulation syndrome or polycystic ovarian disease in the proper   clinical setting. Recommend correlation with laboratory values. Unremarkable appearance of the uterus and endometrial stripe. No free fluid. Normal Doppler flow in both ovaries. US NON OB TRANSVAGINAL   Preliminary Result   Multiple large anechoic cysts noted in both ovaries. This may represent   ovarian hyperstimulation syndrome or polycystic ovarian disease in the proper   clinical setting. Recommend correlation with laboratory values. Unremarkable appearance of the uterus and endometrial stripe. No free fluid. Normal Doppler flow in both ovaries. US DUP ABD PEL RETRO SCROT LIMITED   Preliminary Result   Multiple large anechoic cysts noted in both ovaries. This may represent   ovarian hyperstimulation syndrome or polycystic ovarian disease in the proper   clinical setting. Recommend correlation with laboratory values. Unremarkable appearance of the uterus and endometrial stripe. No free fluid. Normal Doppler flow in both ovaries. CT ABDOMEN PELVIS W IV CONTRAST Additional Contrast? None   Final Result   1. Cystic structures in the bilateral adnexa measuring 7.5 x 4.0 cm on the   left and 3.0 x 4.4 cm on the right. This could represent large bilateral   adnexal cysts, tubo-ovarian abscesses, or hydrosalpinx/pyosalpinx related to   PID. Further evaluation with pelvic ultrasound recommended. 2. Nonvisualization of the appendix. 3. Prior cholecystectomy. 4. Tiny low-density lesions at the upper pole left kidney likely representing   tiny renal cysts. No hydronephrosis. 5. Tiny midline fat containing periumbilical hernia. CTA HEAD NECK W CONTRAST   Final Result   1. No acute intracranial abnormality.    2. 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 3. Small caliber of cavernous/supraclinoid segments of the bilateral internal   carotid arteries, stable. 4. Severe stenosis in the M1 segment of the right MCA, with associated   collateral vessels, likely related to moyamoya disease, stable. 5. Small caliber of M1 segment of the left MCA with collateral vessels,   likely related to moyamoya disease, stable. 6. Severe stenosis in the P1 and P2 segments of the right PCA, with   associated collateral vessels, likely related to moyamoya disease, stable. 7. Small caliber of P1 segment of the left PCA with collateral vessels,   likely related to moyamoya disease, stable. 8. Aneurysmal coil mass posterior to the basilar tip. No recurrent or   residual aneurysm. XR CHEST 1 VIEW   Final Result   No acute abnormality. LABS: All lab results were reviewed by myself, and all abnormals are listed below.   Labs Reviewed   URINALYSIS WITH REFLEX TO CULTURE - Abnormal; Notable for the following components:       Result Value    Turbidity UA SLIGHTLY CLOUDY (*)     Ketones, Urine 2+ (*)     Nitrite, Urine POSITIVE (*)     Leukocyte Esterase, Urine TRACE (*)     All other components within normal limits   HEPATIC FUNCTION PANEL - Abnormal; Notable for the following components:    Alkaline Phosphatase 137 (*)     ALT 42 (*)     All other components within normal limits   BASIC METABOLIC PANEL - Abnormal; Notable for the following components:    Glucose 101 (*)     Creatinine 1.15 (*)     All other components within normal limits   CBC WITH AUTO DIFFERENTIAL - Abnormal; Notable for the following components:    WBC 11.5 (*)     Hemoglobin 15.7 (*)     Hematocrit 48.0 (*)     Seg Neutrophils 81 (*)     Lymphocytes 14 (*)     Eosinophils % 0 (*)     Segs Absolute 9.24 (*)     All other components within normal limits   MICROSCOPIC URINALYSIS - Abnormal; Notable for the following components:    Bacteria, UA MANY (*)     All other components within normal limits   CULTURE, URINE   PROTIME-INR   LIPASE   HCG, QUANTITATIVE, PREGNANCY       EMERGENCY DEPARTMENTCOURSE:         Vitals:    Vitals:    11/25/22 1741 11/25/22 1742 11/25/22 1801 11/25/22 1858   BP: (!) 137/97  (!) 140/88 (!) 140/88   Pulse:  98 96 93   Resp:  22 19 24   Temp:       TempSrc:       SpO2: 97% 98% 97% 97%   Weight:       Height:           The patient was given the following medications while in the emergency department:  Orders Placed This Encounter   Medications    0.9 % sodium chloride bolus    ondansetron (ZOFRAN) injection 4 mg    0.9 % sodium chloride bolus    iopamidol (ISOVUE-370) 76 % injection 75 mL    sodium chloride flush 0.9 % injection 10 mL    fentaNYL (SUBLIMAZE) injection 25 mcg    acetaminophen (TYLENOL) tablet 650 mg    cefTRIAXone (ROCEPHIN) 1,000 mg in dextrose 5 % 50 mL IVPB mini-bag     Order Specific Question:   Antimicrobial Indications     Answer:   Urinary Tract Infection     CONSULTS:  IP CONSULT TO NEUROLOGY  IP CONSULT TO INTERNAL MEDICINE    FINAL IMPRESSION      1. Nausea and vomiting, unspecified vomiting type    2. Generalized abdominal pain    3. Nonintractable headache, unspecified chronicity pattern, unspecified headache type    4. Grider grider disease    5. Acute cystitis without hematuria          DISPOSITION/PLAN   DISPOSITION Decision To Transfer 11/25/2022 05:18:32 PM      PATIENT REFERRED TO:  No follow-up provider specified. DISCHARGE MEDICATIONS:  Discharge Medication List as of 11/25/2022  9:17 PM        The care is provided during an unprecedented national emergency due to the novel coronavirus, COVID 19.   DO Fred Orta DO  11/26/22 0002

## 2022-11-25 NOTE — Clinical Note
Girma Piedra was seen and treated in our emergency department on 11/25/2022. She may return to work on 11/28/2022. If you have any questions or concerns, please don't hesitate to call.       Tyron Street MD

## 2022-11-26 VITALS
TEMPERATURE: 98.1 F | RESPIRATION RATE: 20 BRPM | DIASTOLIC BLOOD PRESSURE: 63 MMHG | OXYGEN SATURATION: 97 % | HEART RATE: 83 BPM | HEIGHT: 58 IN | WEIGHT: 139 LBS | SYSTOLIC BLOOD PRESSURE: 100 MMHG | BODY MASS INDEX: 29.18 KG/M2

## 2022-11-26 PROBLEM — N83.202 CYSTS OF BOTH OVARIES: Status: ACTIVE | Noted: 2022-11-26

## 2022-11-26 PROBLEM — N83.201 CYSTS OF BOTH OVARIES: Status: ACTIVE | Noted: 2022-11-26

## 2022-11-26 PROBLEM — R11.2 NAUSEA AND VOMITING: Status: ACTIVE | Noted: 2022-11-26

## 2022-11-26 LAB
CANDIDA SPECIES, DNA PROBE: NEGATIVE
CULTURE: ABNORMAL
EKG ATRIAL RATE: 110 BPM
EKG P AXIS: 66 DEGREES
EKG P-R INTERVAL: 146 MS
EKG Q-T INTERVAL: 344 MS
EKG QRS DURATION: 70 MS
EKG QTC CALCULATION (BAZETT): 465 MS
EKG R AXIS: 49 DEGREES
EKG T AXIS: 52 DEGREES
EKG VENTRICULAR RATE: 110 BPM
GARDNERELLA VAGINALIS, DNA PROBE: POSITIVE
SOURCE: ABNORMAL
SPECIMEN DESCRIPTION: ABNORMAL
TRICHOMONAS VAGINALIS DNA: NEGATIVE

## 2022-11-26 PROCEDURE — 87491 CHLMYD TRACH DNA AMP PROBE: CPT

## 2022-11-26 PROCEDURE — 87660 TRICHOMONAS VAGIN DIR PROBE: CPT

## 2022-11-26 PROCEDURE — 87510 GARDNER VAG DNA DIR PROBE: CPT

## 2022-11-26 PROCEDURE — 93010 ELECTROCARDIOGRAM REPORT: CPT | Performed by: INTERNAL MEDICINE

## 2022-11-26 PROCEDURE — 99213 OFFICE O/P EST LOW 20 MIN: CPT | Performed by: OBSTETRICS & GYNECOLOGY

## 2022-11-26 PROCEDURE — 87480 CANDIDA DNA DIR PROBE: CPT

## 2022-11-26 PROCEDURE — 87591 N.GONORRHOEAE DNA AMP PROB: CPT

## 2022-11-26 RX ORDER — ACETAMINOPHEN 500 MG
500 TABLET ORAL 4 TIMES DAILY PRN
Qty: 30 TABLET | Refills: 0 | Status: SHIPPED | OUTPATIENT
Start: 2022-11-26

## 2022-11-26 RX ORDER — METRONIDAZOLE 500 MG/1
500 TABLET ORAL 2 TIMES DAILY
Qty: 14 TABLET | Refills: 0 | Status: SHIPPED | OUTPATIENT
Start: 2022-11-26 | End: 2022-12-03

## 2022-11-26 RX ORDER — CEPHALEXIN 250 MG/1
250 CAPSULE ORAL 4 TIMES DAILY
Qty: 28 CAPSULE | Refills: 0 | Status: SHIPPED | OUTPATIENT
Start: 2022-11-26 | End: 2022-12-03

## 2022-11-26 RX ORDER — ONDANSETRON 4 MG/1
4 TABLET, FILM COATED ORAL EVERY 8 HOURS PRN
Qty: 8 TABLET | Refills: 0 | Status: SHIPPED | OUTPATIENT
Start: 2022-11-26

## 2022-11-26 ASSESSMENT — ENCOUNTER SYMPTOMS
NAUSEA: 0
COUGH: 0
SHORTNESS OF BREATH: 0
VOMITING: 0
DIARRHEA: 0
CONSTIPATION: 0
SORE THROAT: 0
ABDOMINAL PAIN: 0
EYE PAIN: 0
BACK PAIN: 0

## 2022-11-26 NOTE — DISCHARGE INSTRUCTIONS
See attached instructions. You are seen and evaluated emergency department for abdominal pain, nausea, vomiting. You are found to have bilateral ovarian cysts and were evaluated by OB. They recommend following up with outpatient gynecology for ongoing management of these. You are found to have a UTI. You were started on IV antibiotics here in the emergency department and should start the course of oral cephalexin, and take to completion. You are found to have bacterial vaginosis as well. You should complete the course of metronidazole as prescribed. You have a gonorrhea/chlamydia lab pending. Please follow-up on [facility] for these results and call your primary care provider or gynecologist if these come back positive. We will send you home with some Zofran for nausea and Tylenol for abdominal discomfort. If you begin to experience significant vomiting and are unable to keep any food or water down from 24 hours, please return to the emergency department. If any of your symptoms worsen significantly please return to the emergency department. Return to the emergency department if you have any symptoms indicating immediate medical care.

## 2022-11-26 NOTE — ED PROVIDER NOTES
[facility]  ED  Emergency Department Encounter  Emergency Medicine Resident     Pt Viridiana Enriquez Bambi Carol  MRN: [id number]  Chasegfarcelia 1985  Date of evaluation: 11/25/22  PCP:  Adel Mcardle, MD      [address] [place] [address]       Chief Complaint   Patient presents with    Emesis    Headache    Nausea    Diarrhea     Headache, N/V x 3 days       HISTORY OF PRESENT ILLNESS  (Location/Symptom, Timing/Onset, Context/Setting, Quality, Duration, Modifying Factors, Severity.)      Lindwood Riedel is a 42 y.o. female who was transferred from outside hospital for [facility] evaluation. Patient presented with nausea, vomiting, headache, abdominal pain for the past 3 days. Has not been able to keep down food but is drinking small amounts of water and has intermittently been able to take some medications. Denies fever or chills. Fatigued but not specifically short of breath. Denies chest pain. She does have history of migraines but states does not feel quite the same. Denies any aura. Blood pressures been elevated, has history of hypertension on amlodipine, but she has not been able to keep down her medications. Has known PCOS with unusual bleeding started approximately 6 months. Has not followed up with gynecology recently. Now having epigastric and some mild pelvic pain. Imaging at outside hospital consistent with multiple ovarian cysts. UA notable for UTI, ceftriaxone started. PAST MEDICAL / SURGICAL / SOCIAL / FAMILY HISTORY      has a past medical history of Anxiety, Asthma, Depression, GERD (gastroesophageal reflux disease), Hyperlipidemia, Hypertension, Migraines, Moyamoya, Ovarian cyst, PCOS (polycystic ovarian syndrome), Raynaud disease, Stroke ([address] and Subarachnoid hemorrhage ([address] has a past surgical history that includes Carotid-subclavian Bypass Graft; cyst removal (Right, 2015); Dental surgery; Dilation and curettage of uterus; Tubal ligation; Cholecystectomy;  Intracranial artery angioplasty; Brain aneurysm surgery (2016); other surgical history (2017); and other surgical history (). Social History     Socioeconomic History    Marital status: Single     Spouse name: Not on file    Number of children: Not on file    Years of education: Not on file    Highest education level: Not on file   Occupational History    Not on file   Tobacco Use    Smoking status: Former     Types: Cigarettes     Quit date: 2002     Years since quittin.9    Smokeless tobacco: Never    Tobacco comments:     caffeine: none   Vaping Use    Vaping Use: Never used   Substance and Sexual Activity    Alcohol use: No     Alcohol/week: 0.0 standard drinks    Drug use: Yes     Types: Marijuana Deadra Phill)     Comment: occassionally    Sexual activity: Yes     Partners: Male     Comment: NFP   Other Topics Concern    Not on file   Social History Narrative    Not on file     Social Determinants of Health     Financial Resource Strain: Not on file   Food Insecurity: Not on file   Transportation Needs: Not on file   Physical Activity: Not on file   Stress: Not on file   Social Connections: Not on file   Intimate Partner Violence: Not on file   Housing Stability: Not on file       Family History   Problem Relation Age of Onset    Diabetes Father     Stroke Father     Heart Attack Father     Alcohol Abuse Father     High Blood Pressure Father     Other Father         pre caner lumps in testicules     Heart Attack Maternal Grandmother     Other Paternal Grandfather         aneurysm     Cancer Maternal Aunt         lung       Allergies:  Cymbalta [duloxetine hcl], Duloxetine, Percocet [oxycodone-acetaminophen], and Benzonatate    Home Medications:  Prior to Admission medications    Medication Sig Start Date End Date Taking?  Authorizing Provider   cephALEXin (KEFLEX) 250 MG capsule Take 1 capsule by mouth 4 times daily for 7 days 11/26/22 12/3/22 Yes Corine Velazquez MD   ondansetron (ZOFRAN) 4 MG tablet Take 1 tablet by mouth every 8 hours as needed for Nausea 11/26/22  Yes Jose Lees MD   acetaminophen (TYLENOL) 500 MG tablet Take 1 tablet by mouth 4 times daily as needed for Pain 11/26/22  Yes Jose Lees MD   metroNIDAZOLE (FLAGYL) 500 MG tablet Take 1 tablet by mouth 2 times daily for 7 days 11/26/22 12/3/22 Yes Jose Lees MD   metroNIDAZOLE (FLAGYL) 500 MG tablet Take 1 tablet by mouth 2 times daily for 7 days 11/26/22 12/3/22 Yes Anabell Holt MD   metroNIDAZOLE (FLAGYL) 500 MG tablet Take 1 tablet by mouth 2 times daily for 7 days 11/26/22 12/3/22  Anabell Holt MD   butalbital-acetaminophen-caffeine (FIORICET, ESGIC) -12 MG per tablet Take 1 tablet by mouth every 4 hours as needed for Headaches 9/9/22   Shanice Delgado DO   dicyclomine (BENTYL) 10 MG capsule Take 1 capsule by mouth every 6 hours as needed (cramps) 11/14/21   Luis Hernandez MD   Erenumab-aooe (AIMOVIG) 79 MG/ML SOAJ Inject into the skin    Historical Provider, MD   benazepril (LOTENSIN) 10 MG tablet Take 10 mg by mouth daily    Historical Provider, MD   tiZANidine (ZANAFLEX) 4 MG tablet Take 4 mg by mouth every 6 hours as needed    Historical Provider, MD   magnesium oxide (MAG-OX) 400 (241.3 Mg) MG TABS tablet TAKE ONE TABLET BY MOUTH ONCE DAILY 7/9/18   Major Villafuerte MD   aspirin 325 MG tablet Take 325 mg by mouth daily    Historical Provider, MD   cyclobenzaprine (FLEXERIL) 5 MG tablet Take 5 mg by mouth 3 times daily as needed for Muscle spasms    Historical Provider, MD   topiramate (TOPAMAX) 100 MG tablet Take 100 mg by mouth 2 times daily    Historical Provider, MD   amitriptyline (ELAVIL) 25 MG tablet Take 1 tab (25 mg) at bedtime for 1 week, then 2 tabs (50 mg) for 1 week, then 3 tabs (75 mg) for 1 week, then 4 tabs (100 mg) thereafter  Patient taking differently: Take 25 mg by mouth nightly Take 1 tab (25 mg) at bedtime for 1 week, then 2 tabs (50 mg) for 1 week, then 3 tabs (75 mg) for 1 week, then 4 tabs (100 mg) thereafter 2/14/18   Mcarthur Dancer, MD   famotidine (PEPCID) 20 MG tablet Take 1 tablet by mouth 2 times daily 10/3/17 2/14/18  Kenzie Bateman DO   FLUoxetine (PROZAC) 40 MG capsule Take 40 mg by mouth daily  7/3/17   Historical Provider, MD   cetirizine (ZYRTEC) 10 MG tablet Take 10 mg by mouth daily  7/4/17   Historical Provider, MD   Docusate Sodium 100 MG TABS Take by mouth 2 times daily    Historical Provider, MD   fluticasone (FLONASE) 50 MCG/ACT nasal spray 1 spray by Nasal route 2 times daily 1/6/17   Kenzie Bateman DO   albuterol sulfate  (90 BASE) MCG/ACT inhaler Inhale 2 puffs into the lungs every 4 hours as needed for Wheezing Ventolin [facility] 2/29/16   Norma Sharif MD       REVIEW OF SYSTEMS    (2-9 systems for level 4, 10 or more for level 5)      Review of Systems   Constitutional:  Negative for appetite change, chills, fatigue and fever. HENT:  Negative for congestion, ear pain and sore throat. Eyes:  Negative for pain and visual disturbance. Respiratory:  Negative for cough and shortness of breath. Cardiovascular:  Negative for chest pain and leg swelling. Gastrointestinal:  Negative for abdominal pain, constipation, diarrhea, nausea and vomiting. Genitourinary:  Negative for dysuria and vaginal bleeding. Musculoskeletal:  Negative for arthralgias, back pain and myalgias. Skin:  Negative for wound. Neurological:  Negative for dizziness, weakness, light-headedness, numbness and headaches. PHYSICAL EXAM   (up to 7 for level 4, 8 or more for level 5)      INITIAL VITALS:   /63   Pulse 83   Temp 98.1 °F (36.7 °C) (Oral)   Resp 20   Ht 4' 10\" (1.473 m)   Wt 139 lb (63 kg)   SpO2 97%   BMI 29.05 kg/m²     Physical Exam  Vitals reviewed. Constitutional:       General: She is not in acute distress. Appearance: Normal appearance. She is well-developed. HENT:      Head: Normocephalic and atraumatic.       Right Ear: External ear normal. Left Ear: External ear normal.      Mouth/Throat:      Mouth: Mucous membranes are moist.      Pharynx: Oropharynx is clear. Eyes:      Extraocular Movements: Extraocular movements intact. Pupils: Pupils are equal, round, and reactive to light. Cardiovascular:      Rate and Rhythm: Regular rhythm. Tachycardia present. Pulses: Normal pulses. Heart sounds: Normal heart sounds. Pulmonary:      Effort: Pulmonary effort is normal.      Breath sounds: Normal breath sounds. No wheezing, rhonchi or rales. Chest:      Chest wall: No crepitus. Abdominal:      Palpations: Abdomen is soft. Tenderness: There is abdominal tenderness (Epigastric with mild generalized tenderness). There is left CVA tenderness (Very mild left CVA tenderness). There is no guarding or rebound. Hernia: No hernia is present. Musculoskeletal:         General: No swelling or tenderness. Normal range of motion. Cervical back: Normal range of motion and neck supple. Skin:     General: Skin is warm and dry. Capillary Refill: Capillary refill takes less than 2 seconds. Neurological:      General: No focal deficit present. Mental Status: She is alert and oriented to person, place, and time.    Psychiatric:         Mood and Affect: Mood normal.         Behavior: Behavior normal.       DIFFERENTIAL  DIAGNOSIS     PLAN (LABS / IMAGING / EKG):  Orders Placed This Encounter   Procedures    VAGINITIS DNA PROBE    C.trachomatis N.gonorrhoeae DNA    Vital Signs (Recheck)    Inpatient consult to Obstetrics / Gynecology       MEDICATIONS ORDERED:  Orders Placed This Encounter   Medications    ketorolac (TORADOL) injection 30 mg    DISCONTD: ondansetron (ZOFRAN) injection 4 mg    0.9 % sodium chloride bolus    cephALEXin (KEFLEX) 250 MG capsule     Sig: Take 1 capsule by mouth 4 times daily for 7 days     Dispense:  28 capsule     Refill:  0    ondansetron (ZOFRAN) 4 MG tablet     Sig: Take 1 tablet by mouth every 8 hours as needed for Nausea     Dispense:  8 tablet     Refill:  0    acetaminophen (TYLENOL) 500 MG tablet     Sig: Take 1 tablet by mouth 4 times daily as needed for Pain     Dispense:  30 tablet     Refill:  0    metroNIDAZOLE (FLAGYL) 500 MG tablet     Sig: Take 1 tablet by mouth 2 times daily for 7 days     Dispense:  14 tablet     Refill:  0    metroNIDAZOLE (FLAGYL) 500 MG tablet     Sig: Take 1 tablet by mouth 2 times daily for 7 days     Dispense:  14 tablet     Refill:  0       DDX: Ovarian cyst, gastroenteritis, pancreatitis, gastritis, gastric ulcers    DIAGNOSTIC RESULTS / EMERGENCY DEPARTMENT COURSE / MDM   LAB RESULTS:  Results for orders placed or performed during the hospital encounter of 11/25/22   VAGINITIS DNA PROBE    Specimen: Vaginal   Result Value Ref Range    Source . VAGINAL SWAB     Trichomonas Vaginalis DNA NEGATIVE NEGATIVE    Gardnerella Vaginalis, DNA Probe POSITIVE (A) NEGATIVE    Candida Species, DNA Probe NEGATIVE NEGATIVE   C.trachomatis N.gonorrhoeae DNA    Specimen: Cervix/Vaginal   Result Value Ref Range    Specimen Description . CERVIX/VAGINAL     C. trachomatis DNA NEGATIVE NEGATIVE    N. gonorrhoeae DNA NEGATIVE NEGATIVE       IMPRESSION: 64-year-old female, complex medical history including moyamoya disease PCOS, now transferred from outside hospital with concerns for ovarian cyst.  Patient initially presented with nausea vomiting and abdominal pain, now with some mild left flank pain and headache. Vital signs with mild hypertension and borderline tachycardia and otherwise within normal limits. Exam notable for epigastric tenderness but otherwise unremarkable. Lipase and other labs at outside hospital unremarkable. Epigastric pain possibly due to gastritis versus ulcers as her pain seems to be exacerbated with eating.   Unlikely to be pancreatitis with normal lipase at outside hospital.  We will plan to give patient another liter of IV fluids as well as Toradol for abdominal/flank pain and headache as well as Zofran as needed for nausea. We will also plan to consult OB to evaluate as this was the reason she was transferred. EMERGENCY DEPARTMENT COURSE:      ED Course as of 11/30/22 1200   Fri Nov 25, 2022   2346 Patient discussed with OB. They will plan to come down to evaluate. [DH]   Sat Nov 26, 2022   0030 Discussed patient with OB after their evaluation. No concerning findings. Vaginitis and GC labs pending. They are okay with patient going home and following up outpatient. [DH]   8012 Patient reevaluated. She is feeling a little better. Discussed option of going home and she is okay as long as there is nothing significant going on. She does not have a ride home and will need to take a taxi. We will plan to p.o. challenge and arrange dispel. [DH]   0049 Patient tolerating p.o. intake. She is comfortable going home. We will send her home with antibiotics for UTI, Zofran, Tylenol. [DH]   5538 Patient now feeling like she is getting kicked out of the hospital.  Endorsing abdominal pain after eating a wilfrido cracker. Discussed that she is not getting kicked out of the hospital, however she cannot stay here if there is no indication or active treatment. Discussed that she will need to attempt to contact her father to get the keys to her house. She agreed to try. [DH]   9980 Discussion with patient. She was able to get a hold of her father who is coming to pick her up. She is unhappy about being transferred and not being admitted likewise discussed at outside hospital.  She does not feel like there was any need for her to be transferred and is not happy about having to pay for it. She feels comfortable going home but is just unhappy about the situation. We will plan to send her home with antibiotics for UTI, Zofran, Tylenol for discomfort and have her follow-up with OB/GYN as soon as she is able to.  [DH]      ED Course User Index  [DH] Juan R Moeller MD CONSULTS:  IP CONSULT TO OB GYN    FINAL IMPRESSION      1. Nausea and vomiting, unspecified vomiting type    2.  Cysts of both ovaries          DISPOSITION / PLAN     DISPOSITION Decision To Discharge 11/26/2022 01:08:50 AM      PATIENT REFERRED TO:  MD Juan Pablo Leroy[address]  [phone]    Schedule an appointment as soon as possible for a visit in 2 days      [address]  [phone]  Schedule an appointment as soon as possible for a visit   For evaluation of ovarian cysts    [facility]  [address]  Go to   If symptoms worsen    DISCHARGE MEDICATIONS:  Discharge Medication List as of 11/26/2022  3:09 AM        START taking these medications    Details   cephALEXin (KEFLEX) 250 MG capsule Take 1 capsule by mouth 4 times daily for 7 days, Disp-28 capsule, R-0Print      ondansetron (ZOFRAN) 4 MG tablet Take 1 tablet by mouth every 8 hours as needed for Nausea, Disp-8 tablet, R-0Print      acetaminophen (TYLENOL) 500 MG tablet Take 1 tablet by mouth 4 times daily as needed for Pain, Disp-30 tablet, R-0Print      metroNIDAZOLE (FLAGYL) 500 MG tablet Take 1 tablet by mouth 2 times daily for 7 days, Disp-14 tablet, R-0Print             Tyron Street MD  Emergency Medicine Resident    (Please note that portions of thisnote were completed with a voice recognition program.  Efforts were made to edit the dictations but occasionally words are mis-transcribed.)       Tyron Street MD  Resident  11/30/22 [id number]

## 2022-11-26 NOTE — ED PROVIDER NOTES
[facility]  ED  eMERGENCY dEPARTMENT eNCOUnter   Attending Attestation     Pt Name: Azul Guido  MRN: [id number]  [place]ia 1985  Date of evaluation: 11/25/22       Azul Guido is a 42 y.o. female who presents with Emesis, Headache, Nausea, and Diarrhea (Headache, N/V x 3 days)      History: Pt transferred to this ED to see OBGYN for large cysts in the setting of PCOS and n/v/d/headache. Exam: Heart rate and rhythm are regular. Lungs are clear to auscultation bilaterally. Abdomen is soft with diffuse tenderness worse in the epigastrium and lower abdomen. Plan for OB consult, will follow the recommendations. Will treat urinary tract infection. I performed a history and physical examination of the patient and discussed management with the resident. I reviewed the residents note and agree with the documented findings and plan of care. Any areas of disagreement are noted on the chart. I was personally present for the key portions of any procedures. I have documented in the chart those procedures where I was not present during the key portions. I have personally reviewed all images and agree with the resident's interpretation. I have reviewed the emergency nurses triage note. I agree with the chief complaint, past medical history, past surgical history, allergies, medications, social and family history as documented unless otherwise noted below. Documentation of the HPI, Physical Exam and Medical Decision Making performed by medical students or scribes is based on my personal performance of the HPI, PE and MDM. For Phys Assistant/ Nurse Practitioner cases/documentation I have had a face to face evaluation of this patient and have completed at least one if not all key elements of the E/M (history, physical exam, and MDM). Additional findings are as noted.     For APC cases I have personally evaluated and examined the patient in conjunction with the APC and agree with the treatment plan and disposition of the patient as recorded by the APC.     Princess Baptiste MD  Attending Emergency  Physician       Maddie Roberts MD  11/25/22 [id number]

## 2022-11-26 NOTE — ED NOTES
MAJOR attempted to arrange transportation for this pt back to home. Pt became upset about being discharged and wanted to speak to Doctor. Dr. Aramis Panchal informed. MAJOR will remain available for future needs.       LUISA Guzman  11/26/22 0616

## 2022-11-26 NOTE — CONSULTS
[address] [facility]    Patient Name: Brianna Wang     Patient : 1985  Room/Bed: [facility]  Admission Date/Time: 2022  9:41 PM  Primary Care Physician: Willy Weinstein MD    Consulting Provider: Samuel Meyers  Reason for Consult: transfer from [facility] for OB eval    CC:   Chief Complaint   Patient presents with    Emesis    Headache    Nausea    Diarrhea     Headache, N/V x 3 days              HPI: Brianna Wang is a 42 y.o. female Scottjack Gleasonu presents as a transfer from [facility] for [employer] evaluation. Per verbal report, patient states that she has experienced multiple episodes of emesis and diarrhea since Wednesday. She denies any sick contacts with similar symptoms as she lives alone. Patient also denies eating any atypical foods. Her last episode of emesis was reported to be yesterday AM. She also endorsed some midline lower abdominal pain that has significantly improved. She denies any fevers/chills and urinary frequency/urgency. She received a fluid bolus at [facility]'s ED. Labs from [address] reviewed and significant for slight leukocytosis of 11.5, UA suspicious for UTI with positive nitrites and leukocyte esterase in which patient received Rocephin 1g x1. HCG quant negative. Creatinine increased at 1.15 which is not new for patient. Additionally, slightly increased AST if 42. No LMP recorded. Patient has had an ablation in . OBGYN was consulted for ovarian cysts on bilateral adnexa seen on CT abdomen pelvis. TVUS obtained and further characterized the ovarian cysts and showed multiple large anechoic cysts in both ovaries. Patient does state that she has a known history of PCOS which she has followed with OBGYN in the past for. She has never been on OCPs or other form of hormonal birth control for this. REVIEW OF SYSTEMS:   A minimum of an eleven point review of systems was completed.     Constitutional: negative fever, negative chills  HEENT: negative visual disturbances, negative headaches  Respiratory: negative dyspnea, negative cough  Cardiovascular: negative chest pain,  negative palpitations  Gastrointestinal: negative abdominal pain, negative RUQ pain, positive N/V, positive diarrhea, negative constipation  Genitourinary: negative dysuria, negative vaginal discharge, negative vaginal bleeding  Dermatological: negative rash  Hematologic: negative bruising  Immunologic/Lymphatic: negative recent illness, negative recent sick contact  Musculoskeletal: negative back pain, negative myalgias, negative arthralgias  Neurological:  negative dizziness, negative weakness  Behavior/Psych: negative depression, negative anxiety    GYNECOLOGICAL HISTORY:  Age of Menopause: patient s/p endometrial ablation, endorses intermittent vaginal light spotting x 6 months    Sexually Active: single partner, contraception - none     Pap History: unknown    Permanent Sterilization: denies  Reversible Birth Control: denies    OBSTETRIC HISTORY:   OB History    Para Term  AB Living   0 0 0 0 0 0   SAB IAB Ectopic Molar Multiple Live Births   0 0 0 0 0 0     PAST MEDICAL HISTORY:   has a past medical history of Anxiety, Asthma, Depression, GERD (gastroesophageal reflux disease), Hyperlipidemia, Hypertension, Migraines, Moyamoya, Ovarian cyst, PCOS (polycystic ovarian syndrome), Raynaud disease, Stroke ([facility] [address] and Subarachnoid hemorrhage ([facility] [address] PAST SURGICAL HISTORY:   has a past surgical history that includes Carotid-subclavian Bypass Graft; cyst removal (Right, ); Dental surgery; Dilation and curettage of uterus; Tubal ligation; Cholecystectomy; Intracranial artery angioplasty; Brain aneurysm surgery (2016); other surgical history (2017); and other surgical history ().     ALLERGIES:  Allergies as of 2022 - Fully Reviewed 2022   Allergen Reaction Noted    Cymbalta [duloxetine hcl] Other (See Comments) 2015    Duloxetine Other (See Comments) 12/09/2008    Percocet [oxycodone-acetaminophen] Other (See Comments) 09/14/2015    Benzonatate Nausea And Vomiting 05/30/2018       MEDICATIONS:  Current Facility-Administered Medications   Medication Dose Route Frequency Provider Last Rate Last Admin    ondansetron (ZOFRAN) injection 4 mg  4 mg IntraVENous Q4H PRN Sreedhar Pruitt MD   4 mg at 11/25/22 2308    0.9 % sodium chloride bolus  1,000 mL IntraVENous Once Sreedhar Pruitt .6 mL/hr at 11/25/22 2310 1,000 mL at 11/25/22 2310     Current Outpatient Medications   Medication Sig Dispense Refill    butalbital-acetaminophen-caffeine (FIORICET, ESGIC) -40 MG per tablet Take 1 tablet by mouth every 4 hours as needed for Headaches 20 tablet 0    dicyclomine (BENTYL) 10 MG capsule Take 1 capsule by mouth every 6 hours as needed (cramps) 20 capsule 0    ondansetron (ZOFRAN ODT) 4 MG disintegrating tablet Take 1 tablet by mouth every 6 hours as needed for Nausea or Vomiting 20 tablet 0    Erenumab-aooe (AIMOVIG) 70 MG/ML SOAJ Inject into the skin      benazepril (LOTENSIN) 10 MG tablet Take 10 mg by mouth daily      tiZANidine (ZANAFLEX) 4 MG tablet Take 4 mg by mouth every 6 hours as needed      magnesium oxide (MAG-OX) 400 (241.3 Mg) MG TABS tablet TAKE ONE TABLET BY MOUTH ONCE DAILY 90 tablet 1    aspirin 325 MG tablet Take 325 mg by mouth daily      cyclobenzaprine (FLEXERIL) 5 MG tablet Take 5 mg by mouth 3 times daily as needed for Muscle spasms      topiramate (TOPAMAX) 100 MG tablet Take 100 mg by mouth 2 times daily      amitriptyline (ELAVIL) 25 MG tablet Take 1 tab (25 mg) at bedtime for 1 week, then 2 tabs (50 mg) for 1 week, then 3 tabs (75 mg) for 1 week, then 4 tabs (100 mg) thereafter (Patient taking differently: Take 25 mg by mouth nightly Take 1 tab (25 mg) at bedtime for 1 week, then 2 tabs (50 mg) for 1 week, then 3 tabs (75 mg) for 1 week, then 4 tabs (100 mg) thereafter) 120 tablet 1    famotidine (PEPCID) 20 MG tablet Take 1 tablet by mouth 2 times daily 180 tablet 1    FLUoxetine (PROZAC) 40 MG capsule Take 40 mg by mouth daily       cetirizine (ZYRTEC) 10 MG tablet Take 10 mg by mouth daily       Docusate Sodium 100 MG TABS Take by mouth 2 times daily      acetaminophen (ACETAMINOPHEN 8 HOUR) 650 MG extended release tablet Take by mouth      fluticasone (FLONASE) 50 MCG/ACT nasal spray 1 spray by Nasal route 2 times daily 1 Bottle 0    albuterol sulfate  (90 BASE) MCG/ACT inhaler Inhale 2 puffs into the lungs every 4 hours as needed for Wheezing Ventolin HFA 1 Inhaler 1     FAMILY HISTORY:  family history includes Alcohol Abuse in her father; Cancer in her maternal aunt; Diabetes in her father; Heart Attack in her father and maternal grandmother; High Blood Pressure in her father; Other in her father and paternal grandfather; Stroke in her father. SOCIAL HISTORY:   reports that she quit smoking about 20 years ago. Her smoking use included cigarettes. She has never used smokeless tobacco. She reports current drug use. Drug: Marijuana Garrel Calender). She reports that she does not drink alcohol. VITALS:  Vitals:    11/25/22 2147 11/25/22 2149 11/26/22 0000 11/26/22 0006   BP:  (!) 149/92 100/63    Pulse:  100 84 83   Resp:  20  20   Temp:  98.1 °F (36.7 °C)     TempSrc:  Oral     SpO2:  98% 97% 97%   Weight: 139 lb (63 kg)      Height: 4' 10\" (1.473 m)                    INPUT/OUTPUT:  No intake/output data recorded. No intake/output data recorded.                                                                                                                                PHYSICAL EXAM:     General appearance:  no apparent distress, alert, and cooperative  HEENT: head atraumatic, normocephalic, moist mucous membranes, trachea midline  Neurologic:  alert, oriented, normal speech with some hoarseness of the voice, no focal findings or movement disorder noted  Lungs:  No increased work of breathing noted  Heart: regular rate and rhythm and no murmur    Abdomen:  soft, non distended and non-tender in all four quadrants  Extremities:  no calf tenderness, non edematous   Musculoskeletal: Gross strength equal and intact throughout, no gross abnormalities, range of motion normal in hips, knees, shoulders and spine  Psychiatric: Mood appropriate, normal affect   Rectal Exam: not indicated  Pelvic Exam:   Chaperone for Intimate Exam: Patient declined chaperone and preferred exam be performed with only attending and resident physician  SSE: normal appearing external genitalia, normal appearing urethral meatus, normal appearing vaginal mucosa without prolapse, normal appearing cervix without lesions or lacerations, cervical os visually closed, normal physiologic discharge present, no vaginal bleeding noted   SVE: Normal sized anteverted uterus, no adnexal masses or tenderness present on exam    LAB RESULTS:  Recent Results (from the past 24 hour(s))   Urinalysis with Reflex to Culture    Collection Time: 11/25/22 11:25 AM    Specimen: Urine   Result Value Ref Range    Color, UA Yellow Yellow    Turbidity UA SLIGHTLY CLOUDY (A) Clear    Glucose, Ur NEGATIVE NEGATIVE    Bilirubin Urine NEGATIVE NEGATIVE    Ketones, Urine 2+ (A) NEGATIVE    Specific Gravity, UA 1.025 1.005 - 1.030    Urine Hgb NEGATIVE NEGATIVE    pH, UA 6.0 5.0 - 8.0    Protein, UA NEGATIVE NEGATIVE    Urobilinogen, Urine Normal Normal    Nitrite, Urine POSITIVE (A) NEGATIVE    Leukocyte Esterase, Urine TRACE (A) NEGATIVE   Microscopic Urinalysis    Collection Time: 11/25/22 11:25 AM   Result Value Ref Range    WBC, UA 10 TO 20 0 - 5 /HPF    RBC, UA None 0 - 2 /HPF    Epithelial Cells UA 10 TO 20 0 - 5 /HPF    Bacteria, UA MANY (A) None   Protime-INR    Collection Time: 11/25/22 11:30 AM   Result Value Ref Range    Protime 13.8 11.5 - 14.2 sec    INR 1.1    Lipase    Collection Time: 11/25/22 11:30 AM   Result Value Ref Range    Lipase 57 13 - 60 U/L   Hepatic Function Panel    Collection Time: 11/25/22 11:30 AM   Result Value Ref Range    Albumin 4.9 3.5 - 5.2 g/dL    Alkaline Phosphatase 137 (H) 35 - 104 U/L    ALT 42 (H) 5 - 33 U/L    AST 31 <32 U/L    Total Bilirubin 0.6 0.3 - 1.2 mg/dL    Bilirubin, Direct 0.1 <0.3 mg/dL    Bilirubin, Indirect 0.5 0.00 - 1.00 mg/dL    Total Protein 8.0 6.4 - 8.3 g/dL   Basic Metabolic Panel    Collection Time: 11/25/22 11:30 AM   Result Value Ref Range    Glucose 101 (H) 70 - 99 mg/dL    BUN 20 6 - 20 mg/dL    Creatinine 1.15 (H) 0.50 - 0.90 mg/dL    Est, Glom Filt Rate >60 >60 mL/min/1.73m2    Bun/Cre Ratio 17 9 - 20    Calcium 9.8 8.6 - 10.4 mg/dL    Sodium 137 135 - 144 mmol/L    Potassium 4.3 3.7 - 5.3 mmol/L    Chloride 102 98 - 107 mmol/L    CO2 20 20 - 31 mmol/L    Anion Gap 15 9 - 17 mmol/L   CBC with Auto Differential    Collection Time: 11/25/22 11:30 AM   Result Value Ref Range    WBC 11.5 (H) 3.5 - 11.3 k/uL    RBC 4.95 3.95 - 5.11 m/uL    Hemoglobin 15.7 (H) 11.9 - 15.1 g/dL    Hematocrit 48.0 (H) 36.3 - 47.1 %    MCV 97.0 82.6 - 102.9 fL    MCH 31.7 25.2 - 33.5 pg    MCHC 32.7 28.4 - 34.8 g/dL    RDW 13.0 11.8 - 14.4 %    Platelets 451 432 - 183 k/uL    MPV 9.1 8.1 - 13.5 fL    NRBC Automated 0.0 0.0 per 100 WBC    Seg Neutrophils 81 (H) 36 - 65 %    Lymphocytes 14 (L) 24 - 43 %    Monocytes 5 3 - 12 %    Eosinophils % 0 (L) 1 - 4 %    Basophils 0 0 - 2 %    Immature Granulocytes 0 0 %    Segs Absolute 9.24 (H) 1.50 - 8.10 k/uL    Absolute Lymph # 1.59 1.10 - 3.70 k/uL    Absolute Mono # 0.56 0.10 - 1.20 k/uL    Absolute Eos # <0.03 0.00 - 0.44 k/uL    Basophils Absolute 0.03 0.00 - 0.20 k/uL    Absolute Immature Granulocyte 0.05 0.00 - 0.30 k/uL   HCG, Quantitative, Pregnancy    Collection Time: 11/25/22 11:30 AM   Result Value Ref Range    hCG Quant <1 <5 mIU/mL   EKG 12 Lead    Collection Time: 11/25/22 11:46 AM   Result Value Ref Range    Ventricular Rate 110 BPM    Atrial Rate 110 BPM    P-R Interval 146 ms    QRS Duration 70 ms    Q-T Interval 344 ms    QTc Calculation (Bazett) 465 ms    P Axis 66 degrees    R Axis 49 degrees    T Axis 52 degrees   VAGINITIS DNA PROBE    Collection Time: 11/26/22 12:38 AM    Specimen: Vaginal   Result Value Ref Range    Source . VAGINAL SWAB     Trichomonas Vaginalis DNA NEGATIVE NEGATIVE    Gardnerella Vaginalis, DNA Probe POSITIVE (A) NEGATIVE    Candida Species, DNA Probe NEGATIVE NEGATIVE     DIAGNOSTICS:  US NON OB TRANSVAGINAL    Result Date: 11/25/2022  EXAMINATION: PELVIC ULTRASOUND; ULTRASOUND OF THE SCROTUM/TESTICLES WITH COLOR DOPPLER FLOW EVALUATION TECHNIQUE: Transabdominal and transvaginal pelvic duplex ultrasound using B-mode/gray scaled imaging, Doppler spectral analysis and color flow Doppler was obtained. COMPARISON: 11/25/2022 CT HISTORY: ORDERING SYSTEM PROVIDED HISTORY: suprapubic pain TECHNOLOGIST PROVIDED HISTORY: suprapubic pain FINDINGS: Measurements: Uterus: 8.7 x 3.9 x 5.1 cm Endometrial stripe: 0.25 cm Right Ovary:58 x 40 x 48 mm Left Ovary: 76 x 49 x 41 mm Ultrasound Findings: Uterus: There is a small intramural uterine fibroid measuring approximately 6 x 7 x 7 mm. The uterus is otherwise unremarkable. Endometrial stripe: Endometrial stripe is within normal limits. Right Ovary: There is a multicystic appearance of the right ovary with multiple simple anechoic follicles measuring up to 25 mm in diameter. There is normal arterial and venous Doppler flow. Left Ovary: There is a multicystic appearance of the left ovary with multiple anechoic follicles measuring up to 38 mm in size. Normal arterial and venous Doppler flow. Free Fluid: No evidence of free fluid. Multiple large anechoic cysts noted in both ovaries. This may represent ovarian hyperstimulation syndrome or polycystic ovarian disease in the proper clinical setting. Recommend correlation with laboratory values. Unremarkable appearance of the uterus and endometrial stripe. No free fluid.  Normal Doppler flow in both ovaries. US PELVIS COMPLETE    Result Date: 11/25/2022  EXAMINATION: PELVIC ULTRASOUND; ULTRASOUND OF THE SCROTUM/TESTICLES WITH COLOR DOPPLER FLOW EVALUATION TECHNIQUE: Transabdominal and transvaginal pelvic duplex ultrasound using B-mode/gray scaled imaging, Doppler spectral analysis and color flow Doppler was obtained. COMPARISON: 11/25/2022 CT HISTORY: ORDERING SYSTEM PROVIDED HISTORY: suprapubic pain TECHNOLOGIST PROVIDED HISTORY: suprapubic pain FINDINGS: Measurements: Uterus: 8.7 x 3.9 x 5.1 cm Endometrial stripe: 0.25 cm Right Ovary:58 x 40 x 48 mm Left Ovary: 76 x 49 x 41 mm Ultrasound Findings: Uterus: There is a small intramural uterine fibroid measuring approximately 6 x 7 x 7 mm. The uterus is otherwise unremarkable. Endometrial stripe: Endometrial stripe is within normal limits. Right Ovary: There is a multicystic appearance of the right ovary with multiple simple anechoic follicles measuring up to 25 mm in diameter. There is normal arterial and venous Doppler flow. Left Ovary: There is a multicystic appearance of the left ovary with multiple anechoic follicles measuring up to 38 mm in size. Normal arterial and venous Doppler flow. Free Fluid: No evidence of free fluid. Multiple large anechoic cysts noted in both ovaries. This may represent ovarian hyperstimulation syndrome or polycystic ovarian disease in the proper clinical setting. Recommend correlation with laboratory values. Unremarkable appearance of the uterus and endometrial stripe. No free fluid. Normal Doppler flow in both ovaries. CT ABDOMEN PELVIS W IV CONTRAST Additional Contrast? None    Result Date: 11/25/2022  EXAMINATION: CT OF THE ABDOMEN AND PELVIS WITH CONTRAST 11/25/2022 12:49 pm TECHNIQUE: CT of the abdomen and pelvis was performed with the administration of intravenous contrast. Multiplanar reformatted images are provided for review.  Automated exposure control, iterative reconstruction, and/or weight based adjustment of the mA/kV was utilized to reduce the radiation dose to as low as reasonably achievable. COMPARISON: None. HISTORY: ORDERING SYSTEM PROVIDED HISTORY: abdominal pain TECHNOLOGIST PROVIDED HISTORY: Abdominal pain Decision Support Exception - unselect if not a suspected or confirmed emergency medical condition->Emergency Medical Condition (MA) Reason for Exam: Mid abvd pain and nausea for 3 days. H/O tubal 78-year-old female with abdominal pain and nausea for 3 days. FINDINGS: Lower Chest: Mild bibasilar atelectasis and respiratory motion. No free intra-abdominal air. Organs: Prior cholecystectomy. Liver, spleen, adrenal glands, and pancreas grossly unremarkable in appearance. No obstructing calculus or hydronephrosis. Tiny low-density lesions at the upper pole left kidney likely representing tiny renal cysts. GI/Bowel: No significant dilation of small bowel loops to suggest small bowel obstruction. Nonvisualization of the appendix. Mild stool burden. Pelvis: Cystic structures in the bilateral adnexa which are multilobulated measuring an estimated 7.5 x 4.0 cm on the left and 3.0 x 4.4 cm on the right on image 127, series 2. No significant free fluid in the pelvis. Uterus grossly unremarkable. Small amount of fluid distending the urinary bladder. No inguinal or pelvic sidewall lymphadenopathy. Peritoneum/Retroperitoneum: Abdominal aorta normal in appearance and caliber. No retroperitoneal lymphadenopathy. Psoas muscles normal in size and symmetric in appearance. Bones/Soft Tissues: Tiny midline fat containing periumbilical hernia. Mild degenerative changes in the lumbar spine. 1. Cystic structures in the bilateral adnexa measuring 7.5 x 4.0 cm on the left and 3.0 x 4.4 cm on the right. This could represent large bilateral adnexal cysts, tubo-ovarian abscesses, or hydrosalpinx/pyosalpinx related to PID. Further evaluation with pelvic ultrasound recommended.  2. Nonvisualization of the appendix. 3. Prior cholecystectomy. 4. Tiny low-density lesions at the upper pole left kidney likely representing tiny renal cysts. No hydronephrosis. 5. Tiny midline fat containing periumbilical hernia. XR CHEST 1 VIEW    Result Date: 11/25/2022  EXAMINATION: ONE XRAY VIEW OF THE CHEST 11/25/2022 11:29 am COMPARISON: None. HISTORY: Acute shortness of breath. FINDINGS: Cardiomediastinal silhouette and pulmonary vasculature are normal.  Possible mild emphysema. No consolidation, pleural effusion, or pneumothorax. No acute abnormality. US DUP ABD PEL RETRO SCROT LIMITED    Result Date: 11/25/2022  EXAMINATION: PELVIC ULTRASOUND; ULTRASOUND OF THE SCROTUM/TESTICLES WITH COLOR DOPPLER FLOW EVALUATION TECHNIQUE: Transabdominal and transvaginal pelvic duplex ultrasound using B-mode/gray scaled imaging, Doppler spectral analysis and color flow Doppler was obtained. COMPARISON: 11/25/2022 CT HISTORY: ORDERING SYSTEM PROVIDED HISTORY: suprapubic pain TECHNOLOGIST PROVIDED HISTORY: suprapubic pain FINDINGS: Measurements: Uterus: 8.7 x 3.9 x 5.1 cm Endometrial stripe: 0.25 cm Right Ovary:58 x 40 x 48 mm Left Ovary: 76 x 49 x 41 mm Ultrasound Findings: Uterus: There is a small intramural uterine fibroid measuring approximately 6 x 7 x 7 mm. The uterus is otherwise unremarkable. Endometrial stripe: Endometrial stripe is within normal limits. Right Ovary: There is a multicystic appearance of the right ovary with multiple simple anechoic follicles measuring up to 25 mm in diameter. There is normal arterial and venous Doppler flow. Left Ovary: There is a multicystic appearance of the left ovary with multiple anechoic follicles measuring up to 38 mm in size. Normal arterial and venous Doppler flow. Free Fluid: No evidence of free fluid. Multiple large anechoic cysts noted in both ovaries.   This may represent ovarian hyperstimulation syndrome or polycystic ovarian disease in the proper clinical setting. Recommend correlation with laboratory values. Unremarkable appearance of the uterus and endometrial stripe. No free fluid. Normal Doppler flow in both ovaries. CTA HEAD NECK W CONTRAST    Result Date: 11/25/2022  EXAMINATION: CTA OF THE HEAD AND NECK WITH CONTRAST 11/25/2022 12:48 pm: TECHNIQUE: CTA of the head and neck was performed with the administration of intravenous contrast. Multiplanar reformatted images are provided for review. MIP images are provided for review. Stenosis of the internal carotid arteries measured using NASCET criteria. Automated exposure control, iterative reconstruction, and/or weight based adjustment of the mA/kV was utilized to reduce the radiation dose to as low as reasonably achievable. Noncontrast CT of the head with reconstructed 2-D images are also provided for review. COMPARISON: CT head September 9, 2022, MRI brain/MRA head June 2, 2022 HISTORY: ORDERING SYSTEM PROVIDED HISTORY: abdominal pain TECHNOLOGIST PROVIDED HISTORY: abdominal pain Decision Support Exception - unselect if not a suspected or confirmed emergency medical condition->Emergency Medical Condition (MA) Reason for Exam: H/o aneurysm with clips, states this headache feels different than before, back left side of head. FINDINGS: CT HEAD: BRAIN/VENTRICLES:  The patient is status post right frontotemporal craniotomy/craniectomy. There is encephalomalacia in the right frontotemporal lobes. There is small encephalomalacia in the left frontal lobe. There is aneurysm coil mass posterior to the basilar tip. No acute intracranial hemorrhage or extraaxial fluid collection. Grey-white differentiation is maintained. No evidence of mass, mass effect or midline shift. No evidence of hydrocephalus. ORBITS: The visualized portion of the orbits demonstrate no acute abnormality. SINUSES:  The visualized paranasal sinuses and mastoid air cells demonstrate no acute abnormality.  SOFT TISSUES/SKULL: No acute abnormality of the visualized skull or soft tissues. CTA NECK: AORTIC ARCH/ARCH VESSELS: No dissection or arterial injury. No significant stenosis of the brachiocephalic or subclavian arteries. CAROTID ARTERIES: There is occlusion of the bilateral internal carotid arteries, starting from their origins, with collateral vessels, and reconstitution in the horizontal petrous segments, likely chronic and related to moyamoya disease. No dissection, arterial injury, or hemodynamically significant stenosis in the bilateral common carotid arteries by NASCET criteria. VERTEBRAL ARTERIES: No dissection, arterial injury, or significant stenosis. SOFT TISSUES: There is bronchial wall thickening, likely related to small airway disease or bronchiolitis. There is 3 cm low-attenuation mass in the posterior mediastinum, may be related to bronchogenic cyst (series 6, image 1). No cervical or superior mediastinal lymphadenopathy. The larynx and pharynx are unremarkable. No acute abnormality of the salivary and thyroid glands. BONES: No acute osseous abnormality. CTA HEAD: ANTERIOR CIRCULATION: There is small caliber of cavernous/supraclinoid segments of the bilateral internal carotid arteries, stable. There is severe stenosis in the M1 segment of the right MCA, with associated collateral vessels, likely related to moyamoya disease, stable. There is small caliber of M1 segment of the left MCA with collateral vessels, likely related to moyamoya disease, stable. No significant stenosis of the anterior cerebral arteries. No aneurysm. POSTERIOR CIRCULATION:  There is severe stenosis in the P1 and P2 segments of the right PCA, with associated collateral vessels, likely related to moyamoya disease, stable. There is small caliber of P1 segment of the left PCA with collateral vessels, likely related to moyamoya disease, stable. No significant stenosis of the vertebral, basilar arteries.   There is aneurysmal coil mass posterior to the basilar tip. No recurrent or residual aneurysm. OTHER: Limited evaluation of the intracranial dural venous sinuses secondary to suboptimal phase of contrast.     1. No acute intracranial abnormality. 2. Occlusion of the bilateral internal carotid arteries, starting from their origins, with collateral vessels, and reconstitution in the horizontal petrous segments, likely chronic and related to moyamoya disease. 3. Small caliber of cavernous/supraclinoid segments of the bilateral internal carotid arteries, stable. 4. Severe stenosis in the M1 segment of the right MCA, with associated collateral vessels, likely related to moyamoya disease, stable. 5. Small caliber of M1 segment of the left MCA with collateral vessels, likely related to moyamoya disease, stable. 6. Severe stenosis in the P1 and P2 segments of the right PCA, with associated collateral vessels, likely related to moyamoya disease, stable. 7. Small caliber of P1 segment of the left PCA with collateral vessels, likely related to moyamoya disease, stable. 8. Aneurysmal coil mass posterior to the basilar tip. No recurrent or residual aneurysm. ASSESSMENT & PLAN:    Cierra Ozuna is a 42 y.o. female  here as a transfer from [address] [place], [place] [address],[address] for OBGYN evaluation for N/V/D, abdominal pain and multiple ovarian cysts on TVUS   - VSS, afebrile   - Patient states that she has experienced N/V/D since [facility]. She felt dehydrated and decided to present to ED at [address] [place], [place] [address],[address]. Symptoms have improved since admission to [facility]   - S/p fluid replacement therapy at [facility]   - UA suspicious for UTI. She is r/p Rocephin 1g x1 at [address] [place], [place] [address],[address].  Ucx still pending   - CBC significant for slight leukocytosis   - Cr elevated at 1.15 which seems to be chronic for patient given abnormal creatinine levels in the past   - Slightly increased AST of 42 which could be 2/2 multiple episodes of emesis   - HCG negative   - Pelvic exam unremarkable   - Gc/C collected and results pending, Vaginitis positive for BV. Recommend treatment if patient is symptomatic and complaining of abnormal vaginal discharge/odor   - TVUS showed multiple large anechoic cysts in both ovaries. Per patient, she received a diagnosis of PCOS in 2012 in which she followed with OBGYN provider for in the past. Normal blood flow to bilateral ovaries. As a result ovarian torsion ruled out as etiology to patient's symptoms   - Strongly suspect symptoms that patient is experiencing is 2/2 to either GI viral illness or UTI as opposed to a gynecologic etiology. Defer treatment of UTI to ED providers. Patient stable for discharge from a gynecologic standpoint once ED workup up for N/V/D and abdominal pain has been completed   - Patient encouraged to follow up with her Primary OB at [facility] at her earliest convenience for routine gynecologic care    Hx endometrial ablation   - Occurred in 2015 in [facility]   - Patient denies salpingectomy at the time of the procedure and does endorse to unprotected sexual intercourse with her partner   - Advised against pregnancy given hx endometrial ablation. Patient expressed understanding. She was encouraged to f/u with OB provider to discuss salpingectomy as she has contraindication to hormonal contraceptives given hx stroke/aneurysms. All questions answered and patient expressed understanding.       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      Attending's Name: Dr. Chas Lynn MD  Ob/Gyn Resident  [facility] [id number]  11/25/2022, 11:50 PM

## 2022-11-26 NOTE — ED TRIAGE NOTES
Pt brought to room 33 via EMS stretcher with c/o headache, N/V x 3 days. Pt denies contact with covid or other sick persons. Pt has history of grider grider disease. No other complaints at this time. Breathing is non-labored. Call light is within reach.

## 2022-11-26 NOTE — ED NOTES
Reported off to [facility] who assumed care of patient for transport to [facility] 's ED.       Sam Pearson, RN  19/93/12 2201

## 2022-11-26 NOTE — PROGRESS NOTES
I did not see, evaluate, or participate in the care of this patient. I signed up for this patient in error. Alejandra Reyes  Emergency Medicine Resident Physician, PGY-3  11/26/22 5:01 AM

## 2022-11-28 LAB
C TRACH DNA GENITAL QL NAA+PROBE: NEGATIVE
N. GONORRHOEAE DNA: NEGATIVE
SPECIMEN DESCRIPTION: NORMAL

## 2022-11-30 NOTE — ED PROVIDER NOTES
[address]   Emergency Department  Emergency Medicine Attending Sign-out     Care of Henna Cortes was assumed from previous attending Dr. Melquiades James and is being seen for Emesis, Headache, Nausea, and Diarrhea (Headache, N/V x 3 days)  . The patient's initial evaluation and plan have been discussed with the prior provider who initially evaluated the patient. Attestation  I was available and discussed any additional care issues that arose and coordinated the management plans with the resident(s) caring for the patient during my duty period. Any areas of disagreement with resident's documentation of care or procedures are noted on the chart. I was personally present for the key portions of any/all procedures, during my duty period. I have documented in the chart those procedures where I was not present during the key portions. BRIEF PATIENT SUMMARY/MDM COURSE PER INITIAL PROVIDER:   RECENT VITALS:     Temp: 98.1 °F (36.7 °C),  Heart Rate: 83, Resp: 20, BP: 100/63, SpO2: 97 %    This patient is a 42 y.o. Female with history of moyamoya and PCOS transferred from outlying facility for OB/GYN consultation for large cysts. Awaiting OB/GYN recommendations.     DIAGNOSTICS/MEDICATIONS:     MEDICATIONS GIVEN:  ED Medication Orders (From admission, onward)      Start Ordered     Status Ordering Provider    11/25/22 2315 11/25/22 2303  ketorolac (TORADOL) injection 30 mg  ONCE         Last MAR action: Given - by Ralph Javed on 11/25/22 at [facility]    11/25/22 2315 11/25/22 2303  0.9 % sodium chloride bolus  ONCE         Last MAR action: Stopped - by Ralph Javed on 11/26/22 at 1800 [facility] DNA PROBE - Abnormal; Notable for the following components:       Result Value    Gardnerella Vaginalis, DNA Probe POSITIVE (*)     All other components within normal limits   C.TRACHOMATIS N.GONORRHOEAE DNA       RADIOLOGY  US NON OB TRANSVAGINAL    Result Date: 11/28/2022  EXAMINATION: PELVIC ULTRASOUND; ULTRASOUND OF THE SCROTUM/TESTICLES WITH COLOR DOPPLER FLOW EVALUATION TECHNIQUE: Transabdominal and transvaginal pelvic duplex ultrasound using B-mode/gray scaled imaging, Doppler spectral analysis and color flow Doppler was obtained. COMPARISON: 11/25/2022 CT HISTORY: ORDERING SYSTEM PROVIDED HISTORY: suprapubic pain TECHNOLOGIST PROVIDED HISTORY: suprapubic pain FINDINGS: Measurements: Uterus: 8.7 x 3.9 x 5.1 cm Endometrial stripe: 0.25 cm Right Ovary:58 x 40 x 48 mm Left Ovary: 76 x 49 x 41 mm Ultrasound Findings: Uterus: There is a small intramural uterine fibroid measuring approximately 6 x 7 x 7 mm. The uterus is otherwise unremarkable. Endometrial stripe: Endometrial stripe is within normal limits. Right Ovary: There is a multicystic appearance of the right ovary with multiple simple anechoic follicles measuring up to 25 mm in diameter. There is normal arterial and venous Doppler flow. Left Ovary: There is a multicystic appearance of the left ovary with multiple anechoic follicles measuring up to 38 mm in size. Normal arterial and venous Doppler flow. Free Fluid: No evidence of free fluid. Multiple large anechoic cysts noted in both ovaries. This may represent ovarian hyperstimulation syndrome or polycystic ovarian disease in the proper clinical setting. Recommend correlation with laboratory values. Unremarkable appearance of the uterus and endometrial stripe. No free fluid. Normal Doppler flow in both ovaries. US PELVIS COMPLETE    Result Date: 11/28/2022  EXAMINATION: PELVIC ULTRASOUND; ULTRASOUND OF THE SCROTUM/TESTICLES WITH COLOR DOPPLER FLOW EVALUATION TECHNIQUE: Transabdominal and transvaginal pelvic duplex ultrasound using B-mode/gray scaled imaging, Doppler spectral analysis and color flow Doppler was obtained.  COMPARISON: 11/25/2022 CT HISTORY: ORDERING SYSTEM PROVIDED HISTORY: suprapubic pain TECHNOLOGIST PROVIDED HISTORY: suprapubic pain FINDINGS: Measurements: Uterus: 8.7 x 3.9 x 5.1 cm Endometrial stripe: 0.25 cm Right Ovary:58 x 40 x 48 mm Left Ovary: 76 x 49 x 41 mm Ultrasound Findings: Uterus: There is a small intramural uterine fibroid measuring approximately 6 x 7 x 7 mm. The uterus is otherwise unremarkable. Endometrial stripe: Endometrial stripe is within normal limits. Right Ovary: There is a multicystic appearance of the right ovary with multiple simple anechoic follicles measuring up to 25 mm in diameter. There is normal arterial and venous Doppler flow. Left Ovary: There is a multicystic appearance of the left ovary with multiple anechoic follicles measuring up to 38 mm in size. Normal arterial and venous Doppler flow. Free Fluid: No evidence of free fluid. Multiple large anechoic cysts noted in both ovaries. This may represent ovarian hyperstimulation syndrome or polycystic ovarian disease in the proper clinical setting. Recommend correlation with laboratory values. Unremarkable appearance of the uterus and endometrial stripe. No free fluid. Normal Doppler flow in both ovaries. CT ABDOMEN PELVIS W IV CONTRAST Additional Contrast? None    Result Date: 11/25/2022  EXAMINATION: CT OF THE ABDOMEN AND PELVIS WITH CONTRAST 11/25/2022 12:49 pm TECHNIQUE: CT of the abdomen and pelvis was performed with the administration of intravenous contrast. Multiplanar reformatted images are provided for review. Automated exposure control, iterative reconstruction, and/or weight based adjustment of the mA/kV was utilized to reduce the radiation dose to as low as reasonably achievable. COMPARISON: None. HISTORY: ORDERING SYSTEM PROVIDED HISTORY: abdominal pain TECHNOLOGIST PROVIDED HISTORY: Abdominal pain Decision Support Exception - unselect if not a suspected or confirmed emergency medical condition->Emergency Medical Condition (MA) Reason for Exam: Mid abvd pain and nausea for 3 days.   H/O tubal 73-year-old female with abdominal pain and nausea for 3 days. FINDINGS: Lower Chest: Mild bibasilar atelectasis and respiratory motion. No free intra-abdominal air. Organs: Prior cholecystectomy. Liver, spleen, adrenal glands, and pancreas grossly unremarkable in appearance. No obstructing calculus or hydronephrosis. Tiny low-density lesions at the upper pole left kidney likely representing tiny renal cysts. GI/Bowel: No significant dilation of small bowel loops to suggest small bowel obstruction. Nonvisualization of the appendix. Mild stool burden. Pelvis: Cystic structures in the bilateral adnexa which are multilobulated measuring an estimated 7.5 x 4.0 cm on the left and 3.0 x 4.4 cm on the right on image 127, series 2. No significant free fluid in the pelvis. Uterus grossly unremarkable. Small amount of fluid distending the urinary bladder. No inguinal or pelvic sidewall lymphadenopathy. Peritoneum/Retroperitoneum: Abdominal aorta normal in appearance and caliber. No retroperitoneal lymphadenopathy. Psoas muscles normal in size and symmetric in appearance. Bones/Soft Tissues: Tiny midline fat containing periumbilical hernia. Mild degenerative changes in the lumbar spine. 1. Cystic structures in the bilateral adnexa measuring 7.5 x 4.0 cm on the left and 3.0 x 4.4 cm on the right. This could represent large bilateral adnexal cysts, tubo-ovarian abscesses, or hydrosalpinx/pyosalpinx related to PID. Further evaluation with pelvic ultrasound recommended. 2. Nonvisualization of the appendix. 3. Prior cholecystectomy. 4. Tiny low-density lesions at the upper pole left kidney likely representing tiny renal cysts. No hydronephrosis. 5. Tiny midline fat containing periumbilical hernia. XR CHEST 1 VIEW    Result Date: 11/25/2022  EXAMINATION: ONE XRAY VIEW OF THE CHEST 11/25/2022 11:29 am COMPARISON: None. HISTORY: Acute shortness of breath.  FINDINGS: Cardiomediastinal silhouette and pulmonary vasculature are normal.  Possible mild emphysema. No consolidation, pleural effusion, or pneumothorax. No acute abnormality. US DUP ABD PEL RETRO SCROT LIMITED    Result Date: 11/28/2022  EXAMINATION: PELVIC ULTRASOUND; ULTRASOUND OF THE SCROTUM/TESTICLES WITH COLOR DOPPLER FLOW EVALUATION TECHNIQUE: Transabdominal and transvaginal pelvic duplex ultrasound using B-mode/gray scaled imaging, Doppler spectral analysis and color flow Doppler was obtained. COMPARISON: 11/25/2022 CT HISTORY: ORDERING SYSTEM PROVIDED HISTORY: suprapubic pain TECHNOLOGIST PROVIDED HISTORY: suprapubic pain FINDINGS: Measurements: Uterus: 8.7 x 3.9 x 5.1 cm Endometrial stripe: 0.25 cm Right Ovary:58 x 40 x 48 mm Left Ovary: 76 x 49 x 41 mm Ultrasound Findings: Uterus: There is a small intramural uterine fibroid measuring approximately 6 x 7 x 7 mm. The uterus is otherwise unremarkable. Endometrial stripe: Endometrial stripe is within normal limits. Right Ovary: There is a multicystic appearance of the right ovary with multiple simple anechoic follicles measuring up to 25 mm in diameter. There is normal arterial and venous Doppler flow. Left Ovary: There is a multicystic appearance of the left ovary with multiple anechoic follicles measuring up to 38 mm in size. Normal arterial and venous Doppler flow. Free Fluid: No evidence of free fluid. Multiple large anechoic cysts noted in both ovaries. This may represent ovarian hyperstimulation syndrome or polycystic ovarian disease in the proper clinical setting. Recommend correlation with laboratory values. Unremarkable appearance of the uterus and endometrial stripe. No free fluid. Normal Doppler flow in both ovaries.      CTA HEAD NECK W CONTRAST    Result Date: 11/25/2022  EXAMINATION: CTA OF THE HEAD AND NECK WITH CONTRAST 11/25/2022 12:48 pm: TECHNIQUE: CTA of the head and neck was performed with the administration of intravenous contrast. Multiplanar reformatted images are provided for review. MIP images are provided for review. Stenosis of the internal carotid arteries measured using NASCET criteria. Automated exposure control, iterative reconstruction, and/or weight based adjustment of the mA/kV was utilized to reduce the radiation dose to as low as reasonably achievable. Noncontrast CT of the head with reconstructed 2-D images are also provided for review. COMPARISON: CT head September 9, 2022, MRI brain/MRA head June 2, 2022 HISTORY: ORDERING SYSTEM PROVIDED HISTORY: abdominal pain TECHNOLOGIST PROVIDED HISTORY: abdominal pain Decision Support Exception - unselect if not a suspected or confirmed emergency medical condition->Emergency Medical Condition (MA) Reason for Exam: H/o aneurysm with clips, states this headache feels different than before, back left side of head. FINDINGS: CT HEAD: BRAIN/VENTRICLES:  The patient is status post right frontotemporal craniotomy/craniectomy. There is encephalomalacia in the right frontotemporal lobes. There is small encephalomalacia in the left frontal lobe. There is aneurysm coil mass posterior to the basilar tip. No acute intracranial hemorrhage or extraaxial fluid collection. Grey-white differentiation is maintained. No evidence of mass, mass effect or midline shift. No evidence of hydrocephalus. ORBITS: The visualized portion of the orbits demonstrate no acute abnormality. SINUSES:  The visualized paranasal sinuses and mastoid air cells demonstrate no acute abnormality. SOFT TISSUES/SKULL: No acute abnormality of the visualized skull or soft tissues. CTA NECK: AORTIC ARCH/ARCH VESSELS: No dissection or arterial injury. No significant stenosis of the brachiocephalic or subclavian arteries. CAROTID ARTERIES: There is occlusion of the bilateral internal carotid arteries, starting from their origins, with collateral vessels, and reconstitution in the horizontal petrous segments, likely chronic and related to moyamoya disease.   No dissection, arterial injury, or hemodynamically significant stenosis in the bilateral common carotid arteries by NASCET criteria. VERTEBRAL ARTERIES: No dissection, arterial injury, or significant stenosis. SOFT TISSUES: There is bronchial wall thickening, likely related to small airway disease or bronchiolitis. There is 3 cm low-attenuation mass in the posterior mediastinum, may be related to bronchogenic cyst (series 6, image 1). No cervical or superior mediastinal lymphadenopathy. The larynx and pharynx are unremarkable. No acute abnormality of the salivary and thyroid glands. BONES: No acute osseous abnormality. CTA HEAD: ANTERIOR CIRCULATION: There is small caliber of cavernous/supraclinoid segments of the bilateral internal carotid arteries, stable. There is severe stenosis in the M1 segment of the right MCA, with associated collateral vessels, likely related to moyamoya disease, stable. There is small caliber of M1 segment of the left MCA with collateral vessels, likely related to moyamoya disease, stable. No significant stenosis of the anterior cerebral arteries. No aneurysm. POSTERIOR CIRCULATION:  There is severe stenosis in the P1 and P2 segments of the right PCA, with associated collateral vessels, likely related to moyamoya disease, stable. There is small caliber of P1 segment of the left PCA with collateral vessels, likely related to moyamoya disease, stable. No significant stenosis of the vertebral, basilar arteries. There is aneurysmal coil mass posterior to the basilar tip. No recurrent or residual aneurysm. OTHER: Limited evaluation of the intracranial dural venous sinuses secondary to suboptimal phase of contrast.     1. No acute intracranial abnormality. 2. Occlusion of the bilateral internal carotid arteries, starting from their origins, with collateral vessels, and reconstitution in the horizontal petrous segments, likely chronic and related to moyamoya disease.  3. Small caliber of

## 2024-10-09 ENCOUNTER — TRANSCRIBE ORDERS (OUTPATIENT)
Dept: ADMINISTRATIVE | Age: 39
End: 2024-10-09

## 2024-10-09 DIAGNOSIS — I67.5 MOYAMOYA DISEASE: Primary | ICD-10-CM

## 2024-10-15 DIAGNOSIS — I10 HYPERTENSION, UNSPECIFIED TYPE: Primary | ICD-10-CM

## 2024-10-16 ENCOUNTER — HOSPITAL ENCOUNTER (OUTPATIENT)
Dept: CT IMAGING | Age: 39
Discharge: HOME | End: 2024-10-18
Payer: MEDICARE

## 2024-10-16 ENCOUNTER — HOSPITAL ENCOUNTER (OUTPATIENT)
Age: 39
Discharge: HOME | End: 2024-10-16
Payer: MEDICARE

## 2024-10-16 DIAGNOSIS — I10 HYPERTENSION, UNSPECIFIED TYPE: ICD-10-CM

## 2024-10-16 DIAGNOSIS — I67.5 MOYAMOYA DISEASE: ICD-10-CM

## 2024-10-16 LAB
CREAT SERPL-MCNC: 1.1 MG/DL (ref 0.5–0.9)
GFR, ESTIMATED: 63 ML/MIN/1.73M2

## 2024-10-16 PROCEDURE — 36415 COLL VENOUS BLD VENIPUNCTURE: CPT

## 2024-10-16 PROCEDURE — 82565 ASSAY OF CREATININE: CPT

## 2024-10-16 PROCEDURE — 6360000004 HC RX CONTRAST MEDICATION: Performed by: FAMILY MEDICINE

## 2024-10-16 PROCEDURE — 70496 CT ANGIOGRAPHY HEAD: CPT

## 2024-10-16 RX ORDER — IOPAMIDOL 755 MG/ML
90 INJECTION, SOLUTION INTRAVASCULAR
Status: COMPLETED | OUTPATIENT
Start: 2024-10-16 | End: 2024-10-16

## 2024-10-16 RX ADMIN — IOPAMIDOL 90 ML: 755 INJECTION, SOLUTION INTRAVENOUS at 14:51

## 2024-10-31 ENCOUNTER — HOSPITAL ENCOUNTER (OUTPATIENT)
Age: 39
Setting detail: SPECIMEN
Discharge: HOME | End: 2024-10-31

## 2024-10-31 LAB
25(OH)D3 SERPL-MCNC: 18.3 NG/ML (ref 30–100)
ALBUMIN SERPL-MCNC: 4.5 G/DL (ref 3.5–5.2)
ALBUMIN/GLOB SERPL: 1.5 {RATIO} (ref 1–2.5)
ALP SERPL-CCNC: 105 U/L (ref 35–104)
ALT SERPL-CCNC: 15 U/L (ref 10–35)
ANION GAP SERPL CALCULATED.3IONS-SCNC: 12 MMOL/L (ref 9–16)
AST SERPL-CCNC: 19 U/L (ref 10–35)
BASOPHILS # BLD: 0.03 K/UL (ref 0–0.2)
BASOPHILS NFR BLD: 0 % (ref 0–2)
BILIRUB DIRECT SERPL-MCNC: 0.1 MG/DL (ref 0–0.2)
BILIRUB INDIRECT SERPL-MCNC: 0.2 MG/DL (ref 0–1)
BILIRUB SERPL-MCNC: 0.3 MG/DL (ref 0–1.2)
BUN SERPL-MCNC: 15 MG/DL (ref 6–20)
CALCIUM SERPL-MCNC: 9.9 MG/DL (ref 8.6–10.4)
CHLORIDE SERPL-SCNC: 109 MMOL/L (ref 98–107)
CO2 SERPL-SCNC: 21 MMOL/L (ref 20–31)
CREAT SERPL-MCNC: 1 MG/DL (ref 0.6–0.9)
EOSINOPHIL # BLD: 0.07 K/UL (ref 0–0.44)
EOSINOPHILS RELATIVE PERCENT: 1 % (ref 1–4)
ERYTHROCYTE [DISTWIDTH] IN BLOOD BY AUTOMATED COUNT: 13.2 % (ref 11.8–14.4)
GFR, ESTIMATED: 73 ML/MIN/1.73M2
GLUCOSE SERPL-MCNC: 94 MG/DL (ref 74–99)
HCT VFR BLD AUTO: 42.9 % (ref 36.3–47.1)
HGB BLD-MCNC: 13.7 G/DL (ref 11.9–15.1)
IMM GRANULOCYTES # BLD AUTO: 0.04 K/UL (ref 0–0.3)
IMM GRANULOCYTES NFR BLD: 1 %
LYMPHOCYTES NFR BLD: 1.92 K/UL (ref 1.1–3.7)
LYMPHOCYTES RELATIVE PERCENT: 25 % (ref 24–43)
MCH RBC QN AUTO: 32 PG (ref 25.2–33.5)
MCHC RBC AUTO-ENTMCNC: 31.9 G/DL (ref 28.4–34.8)
MCV RBC AUTO: 100.2 FL (ref 82.6–102.9)
MONOCYTES NFR BLD: 0.56 K/UL (ref 0.1–1.2)
MONOCYTES NFR BLD: 7 % (ref 3–12)
NEUTROPHILS NFR BLD: 66 % (ref 36–65)
NEUTS SEG NFR BLD: 5.19 K/UL (ref 1.5–8.1)
NRBC BLD-RTO: 0 PER 100 WBC
PLATELET # BLD AUTO: 313 K/UL (ref 138–453)
PMV BLD AUTO: 9.2 FL (ref 8.1–13.5)
POTASSIUM SERPL-SCNC: 4.8 MMOL/L (ref 3.7–5.3)
PROT SERPL-MCNC: 7.5 G/DL (ref 6.6–8.7)
RBC # BLD AUTO: 4.28 M/UL (ref 3.95–5.11)
SODIUM SERPL-SCNC: 142 MMOL/L (ref 136–145)
WBC OTHER # BLD: 7.8 K/UL (ref 3.5–11.3)

## 2024-11-14 ENCOUNTER — OFFICE VISIT (OUTPATIENT)
Dept: NEUROLOGY | Age: 39
End: 2024-11-14
Payer: MEDICARE

## 2024-11-14 VITALS
HEIGHT: 58 IN | WEIGHT: 147.2 LBS | DIASTOLIC BLOOD PRESSURE: 87 MMHG | SYSTOLIC BLOOD PRESSURE: 136 MMHG | HEART RATE: 89 BPM | BODY MASS INDEX: 30.9 KG/M2

## 2024-11-14 DIAGNOSIS — I72.9 ANEURYSM (HCC): Primary | ICD-10-CM

## 2024-11-14 DIAGNOSIS — G43.009 MIGRAINE WITHOUT AURA AND WITHOUT STATUS MIGRAINOSUS, NOT INTRACTABLE: ICD-10-CM

## 2024-11-14 PROCEDURE — 3079F DIAST BP 80-89 MM HG: CPT

## 2024-11-14 PROCEDURE — 99204 OFFICE O/P NEW MOD 45 MIN: CPT

## 2024-11-14 PROCEDURE — 3075F SYST BP GE 130 - 139MM HG: CPT

## 2024-11-14 RX ORDER — GALCANEZUMAB 120 MG/ML
120 INJECTION, SOLUTION SUBCUTANEOUS
COMMUNITY
End: 2024-11-14

## 2024-11-14 RX ORDER — QUETIAPINE FUMARATE 100 MG/1
100 TABLET, FILM COATED ORAL NIGHTLY
COMMUNITY

## 2024-11-14 RX ORDER — RIMEGEPANT SULFATE 75 MG/75MG
75 TABLET, ORALLY DISINTEGRATING ORAL DAILY PRN
Qty: 60 TABLET | Refills: 2 | Status: SHIPPED | OUTPATIENT
Start: 2024-11-14

## 2024-11-14 RX ORDER — ATOGEPANT 10 MG/1
30 TABLET ORAL DAILY
Qty: 30 TABLET | Refills: 3 | Status: SHIPPED | OUTPATIENT
Start: 2024-11-14 | End: 2024-12-14

## 2024-11-15 NOTE — PROGRESS NOTES
[address] [facility], [facility] [address]  [place] [address]  P: [phone]  F: [phone]    NEUROLOGY CLINIC NOTE     PATIENT NAME: Crystal Patterson  PATIENT MRN: [id number]  PRIMARY CARE PHYSICIAN: Estelita Awad MD    HPI:      39-year-old female with past medical history stroke age 17 and 21, TIA 18, right ICA occlusion s/p bypass surgery (from radial and temporal artery), SAH 6/2016 2/2 ruptured basilar tip aneurysm s/p coiling, reocclusion previously clipped right ICA and right ECA-ICA bypass, left ICA stenosis/occlusion, moyamoya, migraines who presents to the clinic for management of migraines.  Reports her headaches began in 2016 after a subarachnoid hemorrhage.    Headache location: varies  Headache quality: stabbing/throbbing  Associated factors:  photophobia, phonophobia, nausea   Intensity: moderate-severe  Onset: 2016  Headache frequency: 3-4x/week  Duration: varies   Headache days per month (initial): 16  Aggravating factors : none  Relieving factors: Tylenol    Prophylactic medications: Emgality, Topamax 100 mg twice daily  Abortive medications: Tylenol  Previously used medications: Elavil (ineffective), Fioricet (rebound headaches), Phenergan, Botox (partially effective), Zofran (makes headaches worse), Aimovig (ineffective), magnesium, propranolol (ineffective)    Clinic visit 11/14/2024:  The patient reports migraines 3-4 times per week with photophobia, phonophobia, nausea the patient states she is currently on Emgality as prophylaxis for migraines, however she states that it does not help much.  She denies any new strokelike symptoms, including paresthesias, focal neurological deficits.  She reports occasional vertigo only when taking weed Gummies.  She attends meetings with [employer] [place].     PATIENT HISTORY:     Past Medical History:   Diagnosis Date    Anxiety     Asthma     Depression     GERD (gastroesophageal

## 2024-11-27 RX ORDER — ONDANSETRON 4 MG/1
4 TABLET, ORALLY DISINTEGRATING ORAL 3 TIMES DAILY PRN
Qty: 21 TABLET | Refills: 0 | Status: SHIPPED | OUTPATIENT
Start: 2024-11-27

## 2025-02-20 ENCOUNTER — OFFICE VISIT (OUTPATIENT)
Dept: NEUROLOGY | Age: 40
End: 2025-02-20

## 2025-02-20 VITALS
SYSTOLIC BLOOD PRESSURE: 124 MMHG | HEART RATE: 77 BPM | DIASTOLIC BLOOD PRESSURE: 74 MMHG | HEIGHT: 58 IN | WEIGHT: 147.6 LBS | BODY MASS INDEX: 30.98 KG/M2

## 2025-02-20 DIAGNOSIS — G43.909 MIGRAINE WITHOUT STATUS MIGRAINOSUS, NOT INTRACTABLE, UNSPECIFIED MIGRAINE TYPE: ICD-10-CM

## 2025-02-20 DIAGNOSIS — G47.9 SLEEP DISORDER, UNSPECIFIED: ICD-10-CM

## 2025-02-20 DIAGNOSIS — G47.00 INSOMNIA, UNSPECIFIED TYPE: Primary | ICD-10-CM

## 2025-02-20 RX ORDER — LASMIDITAN 100 MG/1
50 TABLET ORAL
Qty: 30 TABLET | Refills: 3 | Status: SHIPPED | OUTPATIENT
Start: 2025-02-20 | End: 2025-02-20

## 2025-02-20 NOTE — PROGRESS NOTES
[address] [facility], [facility] [address]  [place] [address]  P: [phone]  F: [phone]    NEUROLOGY CLINIC NOTE     PATIENT NAME: Crystal Patterson  PATIENT MRN: [id number]  PRIMARY CARE PHYSICIAN: Estelita Awad MD    HPI:      39-year-old female with past medical history stroke age 17 and 21, TIA 18, right ICA occlusion s/p bypass surgery (from radial and temporal artery), SAH 6/2016 2/2 ruptured basilar tip aneurysm s/p coiling, reocclusion previously clipped right ICA and right ECA-ICA bypass, left ICA stenosis/occlusion, moyamoya, migraines who presents to the clinic for management of migraines.  Reports her headaches began in 2016 after a subarachnoid hemorrhage.    Headache location: varies  Headache quality: stabbing/throbbing  Associated factors:  photophobia, phonophobia, nausea   Intensity: moderate-severe  Onset: 2016  Headache frequency: 3-4x/week  Duration: varies   Headache days per month (initial): 16  Aggravating factors : none  Relieving factors: Tylenol    Prophylactic medications: Emgality, Topamax 100 mg twice daily  Abortive medications: Tylenol  Previously used medications: Elavil (ineffective), Fioricet (rebound headaches), Phenergan, Botox (partially effective), Zofran (makes headaches worse), Aimovig (ineffective), magnesium, propranolol (ineffective)    Clinic visit 11/14/2024:  The patient reports migraines 3-4 times per week with photophobia, phonophobia, nausea the patient states she is currently on Emgality as prophylaxis for migraines, however she states that it does not help much.  She denies any new strokelike symptoms, including paresthesias, focal neurological deficits.  She reports occasional vertigo only when taking weed Gummies.  She attends meetings with [employer] [place].    Interval History 2/20/25:   The patient reports migraine occurring less frequently than prior. Migraines now occur 2-3 times per

## 2025-02-21 ENCOUNTER — OFFICE VISIT (OUTPATIENT)
Dept: NEUROLOGY | Age: 40
End: 2025-02-21
Payer: MEDICARE

## 2025-02-21 VITALS
HEART RATE: 84 BPM | DIASTOLIC BLOOD PRESSURE: 85 MMHG | HEIGHT: 58 IN | BODY MASS INDEX: 30.73 KG/M2 | SYSTOLIC BLOOD PRESSURE: 119 MMHG | WEIGHT: 146.4 LBS

## 2025-02-21 DIAGNOSIS — I72.9 ANEURYSM: ICD-10-CM

## 2025-02-21 DIAGNOSIS — G43.909 MIGRAINE WITHOUT STATUS MIGRAINOSUS, NOT INTRACTABLE, UNSPECIFIED MIGRAINE TYPE: Primary | ICD-10-CM

## 2025-02-21 PROCEDURE — 3079F DIAST BP 80-89 MM HG: CPT | Performed by: STUDENT IN AN ORGANIZED HEALTH CARE EDUCATION/TRAINING PROGRAM

## 2025-02-21 PROCEDURE — 99215 OFFICE O/P EST HI 40 MIN: CPT | Performed by: STUDENT IN AN ORGANIZED HEALTH CARE EDUCATION/TRAINING PROGRAM

## 2025-02-21 PROCEDURE — 3074F SYST BP LT 130 MM HG: CPT | Performed by: STUDENT IN AN ORGANIZED HEALTH CARE EDUCATION/TRAINING PROGRAM

## 2025-02-21 RX ORDER — AMLODIPINE BESYLATE 2.5 MG/1
2.5 TABLET ORAL DAILY
COMMUNITY
Start: 2025-01-28

## 2025-02-21 NOTE — PROGRESS NOTES
Endovascular Neurosurgery Clinic Note     Pt Name: Crystal Patterson  MRN: [id number]  YOB: 1985  Date of evaluation: 2/21/2025  Primary Care Physician: Estelita Awad MD  Patient evaluated at the request of  Dr. Marcus Vinson   Reason for evaluation: Known patient to our practice with bilateral MoyaMoya s/p bypass and Basilar s/p primary coiling     SUBJECTIVE:   History of Chief Complaint:    Crystal Patterson is a 39 y.o. female  with PMH of stroke at age 17 & 21 and TIA at the age of 18. On previous stroke work up ,she was found to have right ICA occlusion, s/p by pass surgery (from radial and temporal artery). 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 Conventional angiogram performed on 11/22/17 showed complete occlusion of previously clipped proximal right ICA, 99% left stenosis and right ECA-ICA bypass. Since her angiogram patient denies developing any new neurological symptoms however she is still complaining of migraine headache and and currently being managed by neurology/Dr. Alonzo.     She is here for follow-up.  She denies any new focal/cerebrovascular symptoms.  Last DSA 2017.  CTA 2024 stable.  Will proceed diagnosis cerebral angiogram under the care of Dr. Betts.          Allergies  is allergic to chocolate, cymbalta [duloxetine hcl], duloxetine, percocet [oxycodone-acetaminophen], and benzonatate.  Medications  Prior to Admission medications    Medication Sig Start Date End Date Taking? Authorizing Provider   amLODIPine (NORVASC) 2.5 MG tablet Take 1 tablet by mouth daily 1/28/25  Yes ProviderShon MD   Vitamin D3 125 MCG (5000 UT) TABS tablet Take 1 tablet by mouth daily   Yes ProviderShon MD   FLUoxetine (PROZAC) 20 MG capsule Take 1 capsule by mouth daily 12/26/24  Yes Shon Levine MD   ondansetron (ZOFRAN-ODT) 4 MG disintegrating tablet Take 1 tablet by mouth 3 times daily as needed for Nausea or

## 2025-02-26 NOTE — PROGRESS NOTES
ABORTIVE MEDICATIONS FOR MIGRAINE  Many medications are used to treat acute attacks in migraine.  These medications should be taken at the time of the headache and as soon as possible for maximum effectiveness.  Abortive medications should be taken sparingly, given that many are associated with medication overuse headaches.  We recommend use of rescue therapies < 10 times per month to prevent medication overuse headaches (MOH).  For mild or moderate migraine attacks, over the counter medications may be effective.  We typically recommend use of acetaminophen (Tylenol), ibuprofen (Motrin; Advil), or other NSAIDS like naproxen (Aleve).  Please ask your primary care provider before using these OTC medications to ensure that they are safe for you to take.  Some people report a benefit from using non-traditional therapies such as ice caps, relaxation techniques, biofeedback, or peppermint essential oils to manage milder migraine symptoms.   Opioid analgesics (Norco, Percocet, morphine) are not recommended in most cases to treat migraine and have not been shown to be effective in clinical studies.     For moderate and severe headaches, we often use prescription medications, which may be combined with OTC therapies if needed.  One of the most common types of abortive medication prescribed are triptan therapies such as sumatriptan (Imitrex), rizatriptan (Maxalt), naratriptan (Amerge), and  eletriptan (Relpax).  These medications are primarily available as oral pills, but sumatriptan (Imitrex) is also available as a subcutaneous injection and nasal spray.  This may be useful for people who have vomiting with migraine.     There is some evidence that use of dexamethasone or other steroids may reduce the risk of early headache recurrence.     TRIPTANS:   Triptans work by increasing serotonin as serotonin 1b/1d agonists.   Triptan medications include sumatriptan (Imitrex), rizatriptan (Maxalt), naratriptan (Amerge), and

## 2025-03-02 ENCOUNTER — HOSPITAL ENCOUNTER (OUTPATIENT)
Dept: SLEEP CENTER | Age: 40
Discharge: HOME | End: 2025-03-04
Payer: MEDICARE

## 2025-03-02 DIAGNOSIS — G47.9 SLEEP DISORDER, UNSPECIFIED: ICD-10-CM

## 2025-03-02 DIAGNOSIS — G47.00 INSOMNIA, UNSPECIFIED TYPE: ICD-10-CM

## 2025-03-02 PROCEDURE — 95810 POLYSOM 6/> YRS 4/> PARAM: CPT

## 2025-03-03 VITALS
RESPIRATION RATE: 16 BRPM | HEIGHT: 58 IN | WEIGHT: 147 LBS | BODY MASS INDEX: 30.86 KG/M2 | OXYGEN SATURATION: 96 % | HEART RATE: 69 BPM

## 2025-03-03 ASSESSMENT — SLEEP AND FATIGUE QUESTIONNAIRES
HOW LIKELY ARE YOU TO NOD OFF OR FALL ASLEEP WHILE SITTING AND TALKING TO SOMEONE: SLIGHT CHANCE OF DOZING
HOW LIKELY ARE YOU TO NOD OFF OR FALL ASLEEP IN A CAR, WHILE STOPPED FOR A FEW MINUTES IN TRAFFIC: WOULD NEVER DOZE
ESS TOTAL SCORE: 13
HOW LIKELY ARE YOU TO NOD OFF OR FALL ASLEEP WHILE SITTING AND READING: HIGH CHANCE OF DOZING
HOW LIKELY ARE YOU TO NOD OFF OR FALL ASLEEP WHILE SITTING INACTIVE IN A PUBLIC PLACE: SLIGHT CHANCE OF DOZING
HOW LIKELY ARE YOU TO NOD OFF OR FALL ASLEEP WHILE SITTING QUIETLY AFTER LUNCH WITHOUT ALCOHOL: SLIGHT CHANCE OF DOZING
HOW LIKELY ARE YOU TO NOD OFF OR FALL ASLEEP WHILE LYING DOWN TO REST IN THE AFTERNOON WHEN CIRCUMSTANCES PERMIT: MODERATE CHANCE OF DOZING
HOW LIKELY ARE YOU TO NOD OFF OR FALL ASLEEP WHILE WATCHING TV: HIGH CHANCE OF DOZING
HOW LIKELY ARE YOU TO NOD OFF OR FALL ASLEEP WHEN YOU ARE A PASSENGER IN A CAR FOR AN HOUR WITHOUT A BREAK: MODERATE CHANCE OF DOZING

## 2025-03-20 ENCOUNTER — TELEPHONE (OUTPATIENT)
Dept: NEUROLOGY | Age: 40
End: 2025-03-20

## 2025-03-20 NOTE — TELEPHONE ENCOUNTER
Patient called to see if she should have a lab appointment prior to procedure. No. She also asked if she should prep the area where the incision would take place. I advised her that the hospital would probably take care of this. We went over her medications morning of and she vocalized understanding.

## 2025-03-24 ENCOUNTER — HOSPITAL ENCOUNTER (OUTPATIENT)
Dept: INTERVENTIONAL RADIOLOGY/VASCULAR | Age: 40
Discharge: HOME | End: 2025-03-26
Payer: MEDICARE

## 2025-03-24 VITALS
DIASTOLIC BLOOD PRESSURE: 84 MMHG | HEART RATE: 76 BPM | BODY MASS INDEX: 30.86 KG/M2 | HEIGHT: 58 IN | TEMPERATURE: 98 F | WEIGHT: 147 LBS | OXYGEN SATURATION: 97 % | SYSTOLIC BLOOD PRESSURE: 151 MMHG

## 2025-03-24 DIAGNOSIS — I67.5 MOYA MOYA DISEASE: ICD-10-CM

## 2025-03-24 LAB
BUN BLD-MCNC: 18 MG/DL (ref 8–26)
CHLORIDE BLD-SCNC: 113 MMOL/L (ref 98–107)
EGFR, POC: 73 ML/MIN/1.73M2
GLUCOSE BLD-MCNC: 87 MG/DL (ref 74–100)
HCT VFR BLD AUTO: 44 % (ref 36–46)
POC CREATININE: 1 MG/DL (ref 0.51–1.19)
POC HEMOGLOBIN (CALC): 14.9 G/DL (ref 12–16)
POTASSIUM BLD-SCNC: 4.8 MMOL/L (ref 3.5–4.5)
SODIUM BLD-SCNC: 145 MMOL/L (ref 138–146)

## 2025-03-24 PROCEDURE — 7100000011 HC PHASE II RECOVERY - ADDTL 15 MIN: Performed by: STUDENT IN AN ORGANIZED HEALTH CARE EDUCATION/TRAINING PROGRAM

## 2025-03-24 PROCEDURE — 84132 ASSAY OF SERUM POTASSIUM: CPT

## 2025-03-24 PROCEDURE — C1887 CATHETER, GUIDING: HCPCS

## 2025-03-24 PROCEDURE — 36223 PLACE CATH CAROTID/INOM ART: CPT

## 2025-03-24 PROCEDURE — 82947 ASSAY GLUCOSE BLOOD QUANT: CPT

## 2025-03-24 PROCEDURE — 6360000004 HC RX CONTRAST MEDICATION: Performed by: STUDENT IN AN ORGANIZED HEALTH CARE EDUCATION/TRAINING PROGRAM

## 2025-03-24 PROCEDURE — 6360000002 HC RX W HCPCS: Performed by: STUDENT IN AN ORGANIZED HEALTH CARE EDUCATION/TRAINING PROGRAM

## 2025-03-24 PROCEDURE — 76937 US GUIDE VASCULAR ACCESS: CPT

## 2025-03-24 PROCEDURE — 84295 ASSAY OF SERUM SODIUM: CPT

## 2025-03-24 PROCEDURE — 82435 ASSAY OF BLOOD CHLORIDE: CPT

## 2025-03-24 PROCEDURE — 7100000010 HC PHASE II RECOVERY - FIRST 15 MIN: Performed by: STUDENT IN AN ORGANIZED HEALTH CARE EDUCATION/TRAINING PROGRAM

## 2025-03-24 PROCEDURE — 82565 ASSAY OF CREATININE: CPT

## 2025-03-24 PROCEDURE — 36226 PLACE CATH VERTEBRAL ART: CPT

## 2025-03-24 PROCEDURE — 2580000003 HC RX 258: Performed by: PSYCHIATRY & NEUROLOGY

## 2025-03-24 PROCEDURE — 85014 HEMATOCRIT: CPT

## 2025-03-24 PROCEDURE — 84520 ASSAY OF UREA NITROGEN: CPT

## 2025-03-24 RX ORDER — HEPARIN SODIUM 1000 [USP'U]/ML
INJECTION, SOLUTION INTRAVENOUS; SUBCUTANEOUS PRN
Status: COMPLETED | OUTPATIENT
Start: 2025-03-24 | End: 2025-03-24

## 2025-03-24 RX ORDER — SODIUM CHLORIDE 0.9 % (FLUSH) 0.9 %
5-40 SYRINGE (ML) INJECTION EVERY 12 HOURS SCHEDULED
Status: DISCONTINUED | OUTPATIENT
Start: 2025-03-24 | End: 2025-03-27 | Stop reason: HOSPADM

## 2025-03-24 RX ORDER — SODIUM CHLORIDE 9 MG/ML
INJECTION, SOLUTION INTRAVENOUS PRN
Status: DISCONTINUED | OUTPATIENT
Start: 2025-03-24 | End: 2025-03-27 | Stop reason: HOSPADM

## 2025-03-24 RX ORDER — FENTANYL CITRATE 50 UG/ML
INJECTION, SOLUTION INTRAMUSCULAR; INTRAVENOUS PRN
Status: COMPLETED | OUTPATIENT
Start: 2025-03-24 | End: 2025-03-24

## 2025-03-24 RX ORDER — ONDANSETRON 4 MG/1
4 TABLET, ORALLY DISINTEGRATING ORAL EVERY 8 HOURS PRN
Status: DISCONTINUED | OUTPATIENT
Start: 2025-03-24 | End: 2025-03-27 | Stop reason: HOSPADM

## 2025-03-24 RX ORDER — SODIUM CHLORIDE 9 MG/ML
INJECTION, SOLUTION INTRAVENOUS CONTINUOUS
Status: DISCONTINUED | OUTPATIENT
Start: 2025-03-24 | End: 2025-03-27 | Stop reason: HOSPADM

## 2025-03-24 RX ORDER — MIDAZOLAM HYDROCHLORIDE 2 MG/2ML
INJECTION, SOLUTION INTRAMUSCULAR; INTRAVENOUS PRN
Status: COMPLETED | OUTPATIENT
Start: 2025-03-24 | End: 2025-03-24

## 2025-03-24 RX ORDER — SODIUM CHLORIDE 0.9 % (FLUSH) 0.9 %
5-40 SYRINGE (ML) INJECTION PRN
Status: DISCONTINUED | OUTPATIENT
Start: 2025-03-24 | End: 2025-03-27 | Stop reason: HOSPADM

## 2025-03-24 RX ORDER — IODIXANOL 270 MG/ML
100 INJECTION, SOLUTION INTRAVASCULAR
Status: COMPLETED | OUTPATIENT
Start: 2025-03-24 | End: 2025-03-24

## 2025-03-24 RX ORDER — ONDANSETRON 2 MG/ML
4 INJECTION INTRAMUSCULAR; INTRAVENOUS EVERY 6 HOURS PRN
Status: DISCONTINUED | OUTPATIENT
Start: 2025-03-24 | End: 2025-03-27 | Stop reason: HOSPADM

## 2025-03-24 RX ADMIN — IODIXANOL 44 ML: 270 INJECTION, SOLUTION INTRAVASCULAR at 15:25

## 2025-03-24 RX ADMIN — FENTANYL CITRATE 50 MCG: 50 INJECTION, SOLUTION INTRAMUSCULAR; INTRAVENOUS at 14:44

## 2025-03-24 RX ADMIN — HEPARIN SODIUM 2000 UNITS: 1000 INJECTION, SOLUTION INTRAVENOUS; SUBCUTANEOUS at 14:47

## 2025-03-24 RX ADMIN — Medication 2000 MG: at 14:20

## 2025-03-24 RX ADMIN — SODIUM CHLORIDE: 9 INJECTION, SOLUTION INTRAVENOUS at 10:30

## 2025-03-24 RX ADMIN — MIDAZOLAM HYDROCHLORIDE 1 MG: 1 INJECTION, SOLUTION INTRAMUSCULAR; INTRAVENOUS at 14:45

## 2025-03-24 NOTE — H&P
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 Awad    SUBJECTIVE:     Presents for scheduled elective DSA for follow-up. States she is continuing to take Aspirin 325 daily which she has been on for years, and when she had cut down to 81 in the past she experienced more \"minor strokes\". No other blood thinners. Continues to have migraines. No new complaints or focal neuro deficits.     HPI:     History of Chief Complaint:    Crystal Patterson is a 39 y.o. female with pmh of stroke at age 17 & 21 and TIA at the age of 18. On previous stroke work up ,she was found to have right ICA occlusion, s/p by pass surgery (from radial and temporal artery). 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 Conventional angiogram performed on 11/22/17 showed complete occlusion of previously clipped proximal right ICA, 99% left stenosis and right ECA-ICA bypass. Since her angiogram patient denies developing any new neurological symptoms however she is still complaining of migraine headache and and currently being managed by neurology/Dr. Alonzo.      She is here for follow-up.  She denies any new focal/cerebrovascular symptoms.  Last DSA 2017.  CTA 2024 stable.  Will proceed diagnosis cerebral angiogram under the care of Dr. Betts.    Allergies  is allergic to chocolate, cymbalta [duloxetine hcl], duloxetine, percocet [oxycodone-acetaminophen], and benzonatate.  Medications  Prior to Admission medications    Medication Sig Start Date End Date Taking? Authorizing Provider   Zavegepant HCl 10 MG/ACT SOLN 10 mg by Nasal route as needed (One spray (10 mg/spray) in 1 nostril as a single dose. Maximum: One spray (10 mg) per 24 hours (Ref).) One spray (10 mg/spray) in 1 nostril as a single dose. Maximum: One spray (10 mg) per 24 hours.

## 2025-03-24 NOTE — OR NURSING
Pt arrives to room for dx angio  Alert and oriented, speech clear and appropriate.  HERRERA with normal strength  C/O chronic HA rates 7/10  Sensation intact T/O  Numbness to rt hand and rt side of face which is normal for pt  PPP and warm

## 2025-03-24 NOTE — PROGRESS NOTES
Patient admitted, consent signed, all questions answered.  Pt ready for procedure.  Bed in low position, call light to reach with side rails up 2 of 2.    Family  at bedside with patient.

## 2025-03-24 NOTE — PROGRESS NOTES
Returned to [facility] post procedure. Alert. Femostop in place over right groin. At 60 mm  pressure. Deflating 10 mm every 10 mins.

## 2025-03-24 NOTE — PROGRESS NOTES
Reviewed all discharge instructions with patient and . Verbalize good understanding. Discharged ambulatory with  and all belongings.

## 2025-03-24 NOTE — DISCHARGE INSTRUCTIONS
them.   Know what the results and side effects. Report them to your doctor.   Some drugs can be dangerous when mixed. Talk to a doctor or pharmacist if you are taking more than one drug. This includes over-the-counter medicine and herb or dietary supplements.   Plan ahead for refills so you don't run out.      Call Your Doctor If Any of the Following Occurs     :   Signs of infection, including fever and chills   Redness, swelling, increasing pain, excessive bleeding, or any discharge from the catheter insertion site   CALL 911 if you have symptoms including:   Drooping facial muscles   Changes in vision or speech   Difficulty walking or using your limbs   Change in sensation to affected leg, including numbness, feeling cold, or change in color   Extreme sweating, nausea or vomiting   Dizziness or lightheadedness   Chest pain   Rapid, irregular heartbeat   Palpitations   Cough, shortness of breath, or difficulty breathing   Weakness or fainting   If you think you have an emergency, CALL 911 .         Discharge Instructions      SEDATION / ANALGESIA INFORMATION / HOME GOING ADVICE  You have received the sedation/analgesia medication during your visit    Sedation/analgesia is used during short medical procedures under controlled supervision. The medication will produce a strong relaxation. You will be able to hear, speak and follow instructions, but your memory and alertness will be decreased.    You will be able to swallow and breathe on your own. During sedation/analgesia your blood pressure, heart and breathing will be watched closely. After the procedure, you may not remember what was said or done.    You may have the following effects from the medication.  \" Drowsiness, dizziness, sleepiness or confusion.  \" Difficulty remembering or delayed reaction times.  \" Loss of fine muscle control or difficulty with your balance especially while walking.  \" Difficulty focusing or blurred vision.  You may not be aware of

## 2025-03-24 NOTE — OR NURSING
Site covered with quick clot and dsd, secured with tegaderm  Site CDI, no hematoma or ecchymosis  Fem stop applied  Inflated to 70

## 2025-03-25 NOTE — BRIEF OP NOTE
[facility]    NEUROENDOVASCULAR SERVICE: POST-OP NOTE: 3/25/2025    Pt Name: Crystal Patterson  MRN: [id number]  YOB: 1985  Date of Procedure: 3/24/2025  Primary Care Physician: Estelita Awad MD  Referring Physician:Dr. 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  Neurological exam performed and unchanged from initial H&P or consult  MODIFIED ARIEL SCORE: 1 - No significant disability: despite symptoms, able to carry out all usual duties and activities.    Anesthesia: IV Moderate Sedation  An Immediate re-assessment was completed prior to sedation, and it is determined to be safe to proceed.  Complications: none    Intra-Operative EXAM:  Neurological exam performed and unchanged from initial H&P or consult    Patient arrived and wheeled in to the angio suite at: 1411  Monitoring and administration of sedation started at: 1411  Puncture obtained at: 1445  Vascular access was removed at: 1519  Manual pressure for minutes: 15 mins  Sedation ended at: 1535  Patient wheeled out of the angio suite at: 1535    Heparin given: 2000 units  EBL: < Minimal      Cc            Specimens: Were not Obtained  Contrast:     Visipaque 270 low osmolar 44 Cc             Fluoro: 9.9 min    Findings:  Please see dictated Radiology note for further details  Selective angiograms from the R CCA, L CCA, L VA w/ filling of R V4, and R femoral.  Complete R ICA occlusion with robust flow through the ECA supplying the intracranial right hemispheric circulation via numerous anastamoses

## 2025-05-02 ENCOUNTER — OFFICE VISIT (OUTPATIENT)
Dept: NEUROLOGY | Age: 40
End: 2025-05-02
Payer: MEDICARE

## 2025-05-02 VITALS
HEIGHT: 58 IN | HEART RATE: 74 BPM | BODY MASS INDEX: 30.9 KG/M2 | SYSTOLIC BLOOD PRESSURE: 128 MMHG | WEIGHT: 147.2 LBS | DIASTOLIC BLOOD PRESSURE: 74 MMHG

## 2025-05-02 DIAGNOSIS — I72.9 ANEURYSM: Primary | ICD-10-CM

## 2025-05-02 DIAGNOSIS — I67.5 MOYA MOYA DISEASE: ICD-10-CM

## 2025-05-02 PROCEDURE — 3074F SYST BP LT 130 MM HG: CPT | Performed by: STUDENT IN AN ORGANIZED HEALTH CARE EDUCATION/TRAINING PROGRAM

## 2025-05-02 PROCEDURE — 3078F DIAST BP <80 MM HG: CPT | Performed by: STUDENT IN AN ORGANIZED HEALTH CARE EDUCATION/TRAINING PROGRAM

## 2025-05-02 PROCEDURE — 99215 OFFICE O/P EST HI 40 MIN: CPT | Performed by: STUDENT IN AN ORGANIZED HEALTH CARE EDUCATION/TRAINING PROGRAM

## 2025-05-02 RX ORDER — ATOGEPANT 30 MG/1
TABLET ORAL
COMMUNITY
Start: 2025-04-17

## 2025-05-02 NOTE — PROGRESS NOTES
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 Awad    SUBJECTIVE:     Presents after recent DSA showing overall stable pattern of bialteral ICA occlusion with anastamosis and previously coiled basilar tip aneurysm with no residual filling Andi 1. No new complaints or focal neuro deficits in interim. Doing well.    HPI:     History of Chief Complaint:    Crystal Patterson is a 39 y.o. female with pmh of stroke at age 17 & 21 and TIA at the age of 18. On previous stroke work up ,she was found to have right ICA occlusion, s/p by pass surgery (from radial and temporal artery). 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 Conventional angiogram performed on 11/22/17 showed complete occlusion of previously clipped proximal right ICA, 99% left stenosis and right ECA-ICA bypass. Since her angiogram patient denies developing any new neurological symptoms however she is still complaining of migraine headache and and currently being managed by neurology/Dr. Alonzo.      She is here for follow-up.  She denies any new focal/cerebrovascular symptoms.  Last DSA 2017.  CTA 2024 stable.  Will proceed diagnosis cerebral angiogram under the care of Dr. Betts.    Allergies  is allergic to chocolate, cymbalta [duloxetine hcl], duloxetine, percocet [oxycodone-acetaminophen], and benzonatate.  Medications  Prior to Admission medications    Medication Sig Start Date End Date Taking? Authorizing Provider   QULIPTA 30 MG TABS  4/17/25  Yes Provider, MD Shon   amLODIPine (NORVASC) 2.5 MG tablet Take 1 tablet by mouth daily 1/28/25  Yes Provider, MD Shon   Vitamin D3 125 MCG (5000 UT) TABS tablet Take 1 tablet by mouth daily   Yes ProviderShon MD   FLUoxetine (PROZAC) 20 MG capsule Take 1 capsule by mouth daily 12/26/24  Yes

## 2025-06-19 ENCOUNTER — HOSPITAL ENCOUNTER (OUTPATIENT)
Age: 40
Setting detail: SPECIMEN
Discharge: HOME | End: 2025-06-19

## 2025-06-19 LAB
ALBUMIN SERPL-MCNC: 4.3 G/DL (ref 3.5–5.2)
ALBUMIN/GLOB SERPL: 1.5 {RATIO} (ref 1–2.5)
ALP SERPL-CCNC: 103 U/L (ref 35–104)
ALT SERPL-CCNC: 18 U/L (ref 10–35)
ANION GAP SERPL CALCULATED.3IONS-SCNC: 13 MMOL/L (ref 9–16)
AST SERPL-CCNC: 22 U/L (ref 10–35)
BILIRUB SERPL-MCNC: 0.3 MG/DL (ref 0–1.2)
BUN SERPL-MCNC: 15 MG/DL (ref 6–20)
CALCIUM SERPL-MCNC: 9.8 MG/DL (ref 8.6–10.4)
CHLORIDE SERPL-SCNC: 108 MMOL/L (ref 98–107)
CHOLEST SERPL-MCNC: 257 MG/DL (ref 0–199)
CHOLESTEROL/HDL RATIO: 4.9
CK SERPL-CCNC: 59 U/L (ref 26–192)
CO2 SERPL-SCNC: 20 MMOL/L (ref 20–31)
CREAT SERPL-MCNC: 1 MG/DL (ref 0.6–0.9)
EST. AVERAGE GLUCOSE BLD GHB EST-MCNC: 105 MG/DL
GFR, ESTIMATED: 73 ML/MIN/1.73M2
GLUCOSE P FAST SERPL-MCNC: 93 MG/DL (ref 74–99)
HBA1C MFR BLD: 5.3 % (ref 4–6)
HDLC SERPL-MCNC: 52 MG/DL
LDLC SERPL CALC-MCNC: 152 MG/DL (ref 0–100)
POTASSIUM SERPL-SCNC: 4.5 MMOL/L (ref 3.7–5.3)
PROT SERPL-MCNC: 7.2 G/DL (ref 6.6–8.7)
SODIUM SERPL-SCNC: 141 MMOL/L (ref 136–145)
TRIGL SERPL-MCNC: 264 MG/DL (ref 0–149)
TSH SERPL DL<=0.05 MIU/L-ACNC: 0.95 UIU/ML (ref 0.27–4.2)
VLDLC SERPL CALC-MCNC: 53 MG/DL (ref 1–30)

## 2025-06-24 ENCOUNTER — TELEPHONE (OUTPATIENT)
Dept: NEUROLOGY | Age: 40
End: 2025-06-24

## 2025-06-24 NOTE — TELEPHONE ENCOUNTER
PER DR CASTRO      Patient needs Botox migraine at [facility]

## 2025-07-02 NOTE — TELEPHONE ENCOUNTER
PA for Botox submitted to [employer] (primary) and [employer] (secondary) via the [employer] online portal.    [employer] Ref #: [id number]

## 2025-07-07 NOTE — TELEPHONE ENCOUNTER
07 04 2025 [place] [employer] Botox approval attached.  OK to schedule per MM.  I left a voicemail for the patient to call the office back to schedule.   KS

## 2025-07-24 ENCOUNTER — OFFICE VISIT (OUTPATIENT)
Dept: NEUROLOGY | Age: 40
End: 2025-07-24
Payer: MEDICARE

## 2025-07-24 DIAGNOSIS — G43.E19 INTRACTABLE CHRONIC MIGRAINE WITH AURA AND WITHOUT STATUS MIGRAINOSUS: Primary | ICD-10-CM

## 2025-07-24 PROCEDURE — 64615 CHEMODENERV MUSC MIGRAINE: CPT | Performed by: PSYCHIATRY & NEUROLOGY

## 2025-07-30 NOTE — PROGRESS NOTES
[facility] [place] [id number] [facility], [address]  [place] [address]  Ph: [phone] or [phone]  FAX: [phone]          Indication for treatment: Chronic migraine without aura, intractable, with status migrainosus (G 43.526)  Consent form was signed. Please see the associated scanned paper.   Potential risks and benefits for the procedure were explained to the patient.   Procedure: 30-gauge half inch needle was used and 200 U vial Botox was prepared with 4ml 0.9% NS.155 units of Botox were used and 45 units of Botox were discarded.   Botox was injected at 31 different sites as follows:     Order  Muscle  Units injected    A  Corrugator1  10 units at 2 div sites    B  Procerus  5 Units in 1 site    C  Frontalis¹  20 Units div. 4 sites    D  Temporalis¹  40 Units div 8 site    E  Occipitalis¹  30 Units div. 6 sites    F  Cervical paraspinal muscles¹  20 Units div 4 sites    G  Trapezius¹  30 Units div 6 sites    Total Dose  155 Units div 31 sites    1 Dose distributed bilaterally  Blood loss: Less than 1 cc  BOTOX Units used: 155 Units  BOTOX units discarded: 45 Units   Complications: none

## 2025-07-31 ENCOUNTER — HOSPITAL ENCOUNTER (OUTPATIENT)
Age: 40
Setting detail: SPECIMEN
Discharge: HOME | End: 2025-07-31

## 2025-07-31 LAB
BASOPHILS # BLD: <0.03 K/UL (ref 0–0.2)
BASOPHILS NFR BLD: 0 % (ref 0–2)
EOSINOPHIL # BLD: 0.07 K/UL (ref 0–0.44)
EOSINOPHILS RELATIVE PERCENT: 1 % (ref 1–4)
ERYTHROCYTE [DISTWIDTH] IN BLOOD BY AUTOMATED COUNT: 13.5 % (ref 11.8–14.4)
HCT VFR BLD AUTO: 42.7 % (ref 36.3–47.1)
HGB BLD-MCNC: 13.5 G/DL (ref 11.9–15.1)
IMM GRANULOCYTES # BLD AUTO: <0.03 K/UL (ref 0–0.3)
IMM GRANULOCYTES NFR BLD: 0 %
LYMPHOCYTES NFR BLD: 2.05 K/UL (ref 1.1–3.7)
LYMPHOCYTES RELATIVE PERCENT: 26 % (ref 24–43)
MCH RBC QN AUTO: 31.7 PG (ref 25.2–33.5)
MCHC RBC AUTO-ENTMCNC: 31.6 G/DL (ref 28.4–34.8)
MCV RBC AUTO: 100.2 FL (ref 82.6–102.9)
MONOCYTES NFR BLD: 0.49 K/UL (ref 0.1–1.2)
MONOCYTES NFR BLD: 6 % (ref 3–12)
NEUTROPHILS NFR BLD: 67 % (ref 36–65)
NEUTS SEG NFR BLD: 5.23 K/UL (ref 1.5–8.1)
NRBC BLD-RTO: 0 PER 100 WBC
PLATELET # BLD AUTO: 316 K/UL (ref 138–453)
PMV BLD AUTO: 9.7 FL (ref 8.1–13.5)
RBC # BLD AUTO: 4.26 M/UL (ref 3.95–5.11)
WBC OTHER # BLD: 7.9 K/UL (ref 3.5–11.3)

## 2025-08-01 ENCOUNTER — TRANSCRIBE ORDERS (OUTPATIENT)
Dept: ADMINISTRATIVE | Age: 40
End: 2025-08-01

## 2025-08-01 DIAGNOSIS — R06.02 SOB (SHORTNESS OF BREATH): Primary | ICD-10-CM

## 2025-08-01 LAB
ANA SER QL IA: NEGATIVE
DSDNA IGG SER QL IA: <0.5 IU/ML
NUCLEAR IGG SER IA-RTO: 0.3 U/ML

## 2025-08-21 ENCOUNTER — HOSPITAL ENCOUNTER (OUTPATIENT)
Dept: PULMONOLOGY | Age: 40
Discharge: HOME | End: 2025-08-21
Payer: MEDICARE

## 2025-08-21 ENCOUNTER — HOSPITAL ENCOUNTER (OUTPATIENT)
Age: 40
Setting detail: SPECIMEN
Discharge: HOME | End: 2025-08-21

## 2025-08-21 DIAGNOSIS — R06.02 SOB (SHORTNESS OF BREATH): ICD-10-CM

## 2025-08-21 LAB
CHOLEST SERPL-MCNC: 269 MG/DL (ref 0–199)
CHOLESTEROL/HDL RATIO: 5.2
HDLC SERPL-MCNC: 52 MG/DL
LDLC SERPL CALC-MCNC: 175 MG/DL (ref 0–100)
TRIGL SERPL-MCNC: 209 MG/DL (ref 0–149)
VLDLC SERPL CALC-MCNC: 42 MG/DL (ref 1–30)

## 2025-08-21 PROCEDURE — 94060 EVALUATION OF WHEEZING: CPT

## 2025-08-21 PROCEDURE — 94729 DIFFUSING CAPACITY: CPT

## 2025-08-21 PROCEDURE — 94726 PLETHYSMOGRAPHY LUNG VOLUMES: CPT

## 2025-08-21 RX ORDER — ALBUTEROL SULFATE 90 UG/1
2 INHALANT RESPIRATORY (INHALATION) ONCE
Status: DISCONTINUED | OUTPATIENT
Start: 2025-08-21 | End: 2025-08-22 | Stop reason: HOSPADM

## 2025-08-26 ENCOUNTER — OFFICE VISIT (OUTPATIENT)
Dept: NEUROLOGY | Age: 40
End: 2025-08-26
Payer: MEDICARE

## 2025-08-26 VITALS
HEIGHT: 58 IN | WEIGHT: 143.6 LBS | SYSTOLIC BLOOD PRESSURE: 119 MMHG | DIASTOLIC BLOOD PRESSURE: 72 MMHG | BODY MASS INDEX: 30.14 KG/M2 | HEART RATE: 88 BPM

## 2025-08-26 DIAGNOSIS — G43.009 MIGRAINE WITHOUT AURA AND WITHOUT STATUS MIGRAINOSUS, NOT INTRACTABLE: ICD-10-CM

## 2025-08-26 DIAGNOSIS — I60.9 SUBARACHNOID HEMORRHAGE (HCC): ICD-10-CM

## 2025-08-26 DIAGNOSIS — I72.9 ANEURYSM: Primary | ICD-10-CM

## 2025-08-26 DIAGNOSIS — I67.5 MOYA MOYA DISEASE: ICD-10-CM

## 2025-08-26 DIAGNOSIS — G43.E19 INTRACTABLE CHRONIC MIGRAINE WITH AURA AND WITHOUT STATUS MIGRAINOSUS: ICD-10-CM

## 2025-08-26 PROCEDURE — 3078F DIAST BP <80 MM HG: CPT | Performed by: PSYCHIATRY & NEUROLOGY

## 2025-08-26 PROCEDURE — 3074F SYST BP LT 130 MM HG: CPT | Performed by: PSYCHIATRY & NEUROLOGY

## 2025-08-26 PROCEDURE — 99215 OFFICE O/P EST HI 40 MIN: CPT | Performed by: PSYCHIATRY & NEUROLOGY

## 2025-08-26 RX ORDER — BUDESONIDE AND FORMOTEROL FUMARATE DIHYDRATE 160; 4.5 UG/1; UG/1
AEROSOL RESPIRATORY (INHALATION)
COMMUNITY
Start: 2025-08-01